# Patient Record
Sex: FEMALE | Race: AMERICAN INDIAN OR ALASKA NATIVE | HISPANIC OR LATINO | URBAN - METROPOLITAN AREA
[De-identification: names, ages, dates, MRNs, and addresses within clinical notes are randomized per-mention and may not be internally consistent; named-entity substitution may affect disease eponyms.]

---

## 2017-01-16 ENCOUNTER — EMERGENCY (EMERGENCY)
Facility: HOSPITAL | Age: 57
LOS: 1 days | Discharge: HOME | End: 2017-01-16
Attending: EMERGENCY MEDICINE | Admitting: EMERGENCY MEDICINE
Payer: MEDICARE

## 2017-01-16 VITALS
RESPIRATION RATE: 18 BRPM | TEMPERATURE: 98 F | OXYGEN SATURATION: 99 % | DIASTOLIC BLOOD PRESSURE: 79 MMHG | HEART RATE: 78 BPM | WEIGHT: 171.52 LBS | SYSTOLIC BLOOD PRESSURE: 116 MMHG

## 2017-01-16 VITALS
TEMPERATURE: 98 F | RESPIRATION RATE: 18 BRPM | DIASTOLIC BLOOD PRESSURE: 73 MMHG | OXYGEN SATURATION: 100 % | HEART RATE: 81 BPM | SYSTOLIC BLOOD PRESSURE: 119 MMHG

## 2017-01-16 DIAGNOSIS — Z96.642 PRESENCE OF LEFT ARTIFICIAL HIP JOINT: Chronic | ICD-10-CM

## 2017-01-16 DIAGNOSIS — Z88.5 ALLERGY STATUS TO NARCOTIC AGENT: ICD-10-CM

## 2017-01-16 DIAGNOSIS — Z98.89 OTHER SPECIFIED POSTPROCEDURAL STATES: Chronic | ICD-10-CM

## 2017-01-16 DIAGNOSIS — Z79.82 LONG TERM (CURRENT) USE OF ASPIRIN: ICD-10-CM

## 2017-01-16 DIAGNOSIS — G44.209 TENSION-TYPE HEADACHE, UNSPECIFIED, NOT INTRACTABLE: ICD-10-CM

## 2017-01-16 DIAGNOSIS — Z90.89 ACQUIRED ABSENCE OF OTHER ORGANS: Chronic | ICD-10-CM

## 2017-01-16 DIAGNOSIS — E78.5 HYPERLIPIDEMIA, UNSPECIFIED: ICD-10-CM

## 2017-01-16 DIAGNOSIS — E78.00 PURE HYPERCHOLESTEROLEMIA, UNSPECIFIED: ICD-10-CM

## 2017-01-16 DIAGNOSIS — R07.89 OTHER CHEST PAIN: ICD-10-CM

## 2017-01-16 DIAGNOSIS — Z79.899 OTHER LONG TERM (CURRENT) DRUG THERAPY: ICD-10-CM

## 2017-01-16 DIAGNOSIS — R20.0 ANESTHESIA OF SKIN: ICD-10-CM

## 2017-01-16 DIAGNOSIS — M20.40 OTHER HAMMER TOE(S) (ACQUIRED), UNSPECIFIED FOOT: Chronic | ICD-10-CM

## 2017-01-16 DIAGNOSIS — E04.9 NONTOXIC GOITER, UNSPECIFIED: Chronic | ICD-10-CM

## 2017-01-16 DIAGNOSIS — Z90.710 ACQUIRED ABSENCE OF BOTH CERVIX AND UTERUS: Chronic | ICD-10-CM

## 2017-01-16 DIAGNOSIS — Z88.8 ALLERGY STATUS TO OTHER DRUGS, MEDICAMENTS AND BIOLOGICAL SUBSTANCES STATUS: ICD-10-CM

## 2017-01-16 DIAGNOSIS — J45.909 UNSPECIFIED ASTHMA, UNCOMPLICATED: ICD-10-CM

## 2017-01-16 DIAGNOSIS — Z88.6 ALLERGY STATUS TO ANALGESIC AGENT: ICD-10-CM

## 2017-01-16 LAB
ALBUMIN SERPL ELPH-MCNC: 3.4 G/DL — SIGNIFICANT CHANGE UP (ref 3.4–5)
ALP SERPL-CCNC: 133 U/L — HIGH (ref 40–120)
ALT FLD-CCNC: 37 U/L — SIGNIFICANT CHANGE UP (ref 12–42)
ANION GAP SERPL CALC-SCNC: 6 MMOL/L — LOW (ref 9–16)
APTT BLD: 30.9 SEC — SIGNIFICANT CHANGE UP (ref 27.5–37.4)
AST SERPL-CCNC: 24 U/L — SIGNIFICANT CHANGE UP (ref 15–37)
BASOPHILS NFR BLD AUTO: 0.9 % — SIGNIFICANT CHANGE UP (ref 0–2)
BILIRUB SERPL-MCNC: 0.9 MG/DL — SIGNIFICANT CHANGE UP (ref 0.2–1.2)
BUN SERPL-MCNC: 11 MG/DL — SIGNIFICANT CHANGE UP (ref 7–23)
CALCIUM SERPL-MCNC: 9 MG/DL — SIGNIFICANT CHANGE UP (ref 8.5–10.5)
CHLORIDE SERPL-SCNC: 105 MMOL/L — SIGNIFICANT CHANGE UP (ref 96–108)
CK MB CFR SERPL CALC: <1 NG/ML — SIGNIFICANT CHANGE UP (ref 0.5–3.6)
CK SERPL-CCNC: 109 U/L — SIGNIFICANT CHANGE UP (ref 26–192)
CO2 SERPL-SCNC: 29 MMOL/L — SIGNIFICANT CHANGE UP (ref 22–31)
CREAT SERPL-MCNC: 0.91 MG/DL — SIGNIFICANT CHANGE UP (ref 0.5–1.3)
EOSINOPHIL NFR BLD AUTO: 3.8 % — SIGNIFICANT CHANGE UP (ref 0–6)
GLUCOSE SERPL-MCNC: 102 MG/DL — HIGH (ref 70–99)
HCT VFR BLD CALC: 38.9 % — SIGNIFICANT CHANGE UP (ref 34.5–45)
HGB BLD-MCNC: 12.9 G/DL — SIGNIFICANT CHANGE UP (ref 11.5–15.5)
INR BLD: 1.07 — SIGNIFICANT CHANGE UP (ref 0.88–1.16)
LYMPHOCYTES # BLD AUTO: 37.1 % — SIGNIFICANT CHANGE UP (ref 13–44)
MCHC RBC-ENTMCNC: 28.1 PG — SIGNIFICANT CHANGE UP (ref 27–34)
MCHC RBC-ENTMCNC: 33.2 G/DL — SIGNIFICANT CHANGE UP (ref 32–36)
MCV RBC AUTO: 84.7 FL — SIGNIFICANT CHANGE UP (ref 80–100)
MONOCYTES NFR BLD AUTO: 5.6 % — SIGNIFICANT CHANGE UP (ref 2–14)
NEUTROPHILS NFR BLD AUTO: 52.6 % — SIGNIFICANT CHANGE UP (ref 43–77)
PLATELET # BLD AUTO: 186 K/UL — SIGNIFICANT CHANGE UP (ref 150–400)
POTASSIUM SERPL-MCNC: 4.5 MMOL/L — SIGNIFICANT CHANGE UP (ref 3.5–5.3)
POTASSIUM SERPL-SCNC: 4.5 MMOL/L — SIGNIFICANT CHANGE UP (ref 3.5–5.3)
PROT SERPL-MCNC: 7.5 G/DL — SIGNIFICANT CHANGE UP (ref 6.4–8.2)
PROTHROM AB SERPL-ACNC: 11.9 SEC — SIGNIFICANT CHANGE UP (ref 10–13.1)
RBC # BLD: 4.59 M/UL — SIGNIFICANT CHANGE UP (ref 3.8–5.2)
RBC # FLD: 13.1 % — SIGNIFICANT CHANGE UP (ref 10.3–16.9)
SODIUM SERPL-SCNC: 140 MMOL/L — SIGNIFICANT CHANGE UP (ref 135–145)
TROPONIN I SERPL-MCNC: <0.015 NG/ML — SIGNIFICANT CHANGE UP (ref 0.01–0.04)
TROPONIN I SERPL-MCNC: <0.015 NG/ML — SIGNIFICANT CHANGE UP (ref 0.01–0.04)
WBC # BLD: 6.6 K/UL — SIGNIFICANT CHANGE UP (ref 3.8–10.5)
WBC # FLD AUTO: 6.6 K/UL — SIGNIFICANT CHANGE UP (ref 3.8–10.5)

## 2017-01-16 PROCEDURE — 71045 X-RAY EXAM CHEST 1 VIEW: CPT

## 2017-01-16 PROCEDURE — 71010: CPT | Mod: 26

## 2017-01-16 PROCEDURE — 85025 COMPLETE CBC W/AUTO DIFF WBC: CPT

## 2017-01-16 PROCEDURE — 70496 CT ANGIOGRAPHY HEAD: CPT

## 2017-01-16 PROCEDURE — 82550 ASSAY OF CK (CPK): CPT

## 2017-01-16 PROCEDURE — 85610 PROTHROMBIN TIME: CPT

## 2017-01-16 PROCEDURE — 93005 ELECTROCARDIOGRAM TRACING: CPT

## 2017-01-16 PROCEDURE — 82553 CREATINE MB FRACTION: CPT

## 2017-01-16 PROCEDURE — 70450 CT HEAD/BRAIN W/O DYE: CPT | Mod: 26,59

## 2017-01-16 PROCEDURE — 93010 ELECTROCARDIOGRAM REPORT: CPT

## 2017-01-16 PROCEDURE — 99285 EMERGENCY DEPT VISIT HI MDM: CPT | Mod: 25

## 2017-01-16 PROCEDURE — 70496 CT ANGIOGRAPHY HEAD: CPT | Mod: 26

## 2017-01-16 PROCEDURE — 99284 EMERGENCY DEPT VISIT MOD MDM: CPT | Mod: 25

## 2017-01-16 PROCEDURE — 70498 CT ANGIOGRAPHY NECK: CPT | Mod: 26

## 2017-01-16 PROCEDURE — 70498 CT ANGIOGRAPHY NECK: CPT

## 2017-01-16 PROCEDURE — 36415 COLL VENOUS BLD VENIPUNCTURE: CPT

## 2017-01-16 PROCEDURE — 85730 THROMBOPLASTIN TIME PARTIAL: CPT

## 2017-01-16 PROCEDURE — 70450 CT HEAD/BRAIN W/O DYE: CPT

## 2017-01-16 PROCEDURE — 84484 ASSAY OF TROPONIN QUANT: CPT

## 2017-01-16 PROCEDURE — 80053 COMPREHEN METABOLIC PANEL: CPT

## 2017-01-16 NOTE — ED ADULT TRIAGE NOTE - CHIEF COMPLAINT QUOTE
c/o left facial numbness/pain and chest pressure that started yesterday. pt was referred by neurologist to r/o TIA. Noted weakness to left arm. Pt with spinal cord stimulator for spinal cord injury

## 2017-01-16 NOTE — ED ADULT NURSE NOTE - PMH
Anxiety  /Depression  Anxiety  panic attacks  Asthma  deniies recent attack, or hospitalization  Asthma    Disc degeneration, lumbosacral    Fibromyalgia    GERD (gastroesophageal reflux disease)    GERD (gastroesophageal reflux disease)    HLD (hyperlipidemia)    Hypercholesteremia    Hypothyroid    Hypothyroidism    IBS (irritable bowel syndrome)    IBS (irritable bowel syndrome)    Insomnia    Leiomyoma    Lower back pain  Chronic  Lumbar spondylosis    OA (osteoarthritis)    Osteoarthritis    Osteoporosis    Rotator cuff injury  right  Thyroid nodule

## 2017-01-16 NOTE — ED PROVIDER NOTE - CARE PLAN
Principal Discharge DX:	Numbness of face  Secondary Diagnosis:	Acute non intractable tension-type headache  Secondary Diagnosis:	Other chest pain

## 2017-01-16 NOTE — ED PROVIDER NOTE - PROGRESS NOTE DETAILS
Pt denies pain, states that numbness improved. Understands to follow up with neurology and cardiology as an outpatient.

## 2017-01-16 NOTE — ED ADULT NURSE NOTE - PSH
H/O abdominal hysterectomy    H/O arthroscopy of left knee  2010  H/O cervical discectomy  6/2012  H/O colonoscopy with polypectomy    H/O elbow surgery  1974 - left  H/O partial thyroidectomy  2006  H/O thyroidectomy  complete 2012  Hammertoe  s/p repair 1995  History of back surgery  Cervical and Lumbar Spine, most recently 04/2015  History of carpal tunnel release  Right Carpal Tunnel Release  History of partial hysterectomy    History of tonsillectomy    History of total hip replacement, left  01/2015  S/P rotator cuff repair  2001 - right  Status post arthroscopic knee surgery  Left Knee  Status post rotator cuff repair    Thyroid goiter  s/p resection X 2

## 2017-01-16 NOTE — ED ADULT NURSE NOTE - OBJECTIVE STATEMENT
Pt ambulatory to ER for evaluation of numbness and pain to L side of face x 2 days ago w/symptoms becoming increasingly worse since yesterday. Reports "chest pressure for a couple of days." +nausea. Denies fever/chills, vomiting, abdominal pain. patient states no new numbness in lower extremities, reports tingling in upper extremities although pt reports she usually has the same symptoms at baseline due to neuropathy. Speaking in full clear sentences. EKG at triage. Attached to CM, o2 sat monitor, awaiting further medical evaluation at this time. safety precautions maintained, will continue to monitor.

## 2017-01-16 NOTE — ED PROVIDER NOTE - MEDICAL DECISION MAKING DETAILS
57 yo female with multiple medical complaints including HA, CP, numbness tingling sent into ED by Dr. Santana, pt cardiologist (no h/o MI as per pt). She is very anxious on exam and continues to change her story in terms of her symptoms. Labs, CT, CTA unremarkable. Stroke attending evaluated pt in and also feels that symptoms are not consistent with CVA. Will 2 trop given CP, and alert Dr. Santana. I also do not feel that pt is having TIA, acute CVA, SAH or ACS given inconsistency and distribution of symptoms and lack of objective findings.

## 2017-01-16 NOTE — ED PROVIDER NOTE - OBJECTIVE STATEMENT
55 yo female with psych history presenting with multiple complaints to her cardiologist. Pt was having a few days of sharp "shooting" headache bifrontal that would last for approximately 10 seconds then resolve at which time pt describes numbness of the L side of her lower face as well as numbness and tingling of finger tips on both hands. Pt also describes chest pain, intermittent in nature that is non radiating. Pt denies SOB, no cardiac or PE risk factors noted.

## 2017-04-17 ENCOUNTER — FORM ENCOUNTER (OUTPATIENT)
Age: 57
End: 2017-04-17

## 2017-04-18 ENCOUNTER — OUTPATIENT (OUTPATIENT)
Dept: OUTPATIENT SERVICES | Facility: HOSPITAL | Age: 57
LOS: 1 days | End: 2017-04-18
Payer: MEDICARE

## 2017-04-18 DIAGNOSIS — Z90.710 ACQUIRED ABSENCE OF BOTH CERVIX AND UTERUS: Chronic | ICD-10-CM

## 2017-04-18 DIAGNOSIS — Z98.89 OTHER SPECIFIED POSTPROCEDURAL STATES: Chronic | ICD-10-CM

## 2017-04-18 DIAGNOSIS — E04.9 NONTOXIC GOITER, UNSPECIFIED: Chronic | ICD-10-CM

## 2017-04-18 DIAGNOSIS — Z90.89 ACQUIRED ABSENCE OF OTHER ORGANS: Chronic | ICD-10-CM

## 2017-04-18 DIAGNOSIS — M20.40 OTHER HAMMER TOE(S) (ACQUIRED), UNSPECIFIED FOOT: Chronic | ICD-10-CM

## 2017-04-18 DIAGNOSIS — Z96.642 PRESENCE OF LEFT ARTIFICIAL HIP JOINT: Chronic | ICD-10-CM

## 2017-04-18 PROCEDURE — 76536 US EXAM OF HEAD AND NECK: CPT | Mod: 26

## 2017-04-18 PROCEDURE — 76536 US EXAM OF HEAD AND NECK: CPT

## 2017-04-28 VITALS
TEMPERATURE: 99 F | OXYGEN SATURATION: 97 % | HEART RATE: 87 BPM | RESPIRATION RATE: 18 BRPM | SYSTOLIC BLOOD PRESSURE: 134 MMHG | HEIGHT: 61 IN | DIASTOLIC BLOOD PRESSURE: 67 MMHG | WEIGHT: 169.98 LBS

## 2017-04-28 RX ORDER — ALBUTEROL 90 UG/1
0 AEROSOL, METERED ORAL
Qty: 0 | Refills: 0 | COMMUNITY

## 2017-04-28 RX ORDER — MONTELUKAST 4 MG/1
1 TABLET, CHEWABLE ORAL
Qty: 0 | Refills: 0 | COMMUNITY

## 2017-04-28 RX ORDER — CYCLOBENZAPRINE HYDROCHLORIDE 10 MG/1
1 TABLET, FILM COATED ORAL
Qty: 0 | Refills: 0 | COMMUNITY

## 2017-04-28 NOTE — ASU PATIENT PROFILE, ADULT - PSH
H/O abdominal hysterectomy    H/O arthroscopy of left knee  2010  H/O cervical discectomy  6/2012  H/O colonoscopy with polypectomy    H/O elbow surgery  1974 - left  H/O partial thyroidectomy  2006  H/O thyroidectomy  complete 2012  Hammertoe  s/p repair 1995  History of back surgery  Cervical and Lumbar Spine, most recently 04/2015  History of carpal tunnel release  Right Carpal Tunnel Release  History of partial hysterectomy    History of tonsillectomy    History of total hip replacement, left  01/2015  S/P rotator cuff repair  2001 - right  Status post arthroscopic knee surgery  Left Knee  Status post rotator cuff repair    Thyroid goiter  s/p resection X 2 H/O abdominal hysterectomy    H/O arthroscopy of left knee  2010  H/O cervical discectomy  6/2012  H/O colonoscopy with polypectomy    H/O elbow surgery  1974 - left  H/O partial thyroidectomy  2006  H/O thyroidectomy  complete 2012  Hammertoe  s/p repair 1995  History of back surgery  Cervical and Lumbar Spine, most recently 04/2015  History of carpal tunnel release  Right Carpal Tunnel Release  History of partial hysterectomy    History of tonsillectomy    History of total hip replacement, left  01/2015  S/P rotator cuff repair  2001 - right  Spinal cord stimulator status    Status post arthroscopic knee surgery  Left Knee  Status post rotator cuff repair    Thyroid goiter  s/p resection X 2

## 2017-04-28 NOTE — ASU PATIENT PROFILE, ADULT - TEACHING/LEARNING LEARNING PREFERENCES
pictorial/written material/verbal instruction/audio/skill demonstration written material/skill demonstration/audio

## 2017-04-28 NOTE — ASU PREOP CHECKLIST - INTERNAL PROSTHESES
yes(specify)/Left THR, metal in left lumbar, right cervical spinal cord stimulator ;Left THR, metal in left lumbar, right cervical/yes(specify)

## 2017-05-01 ENCOUNTER — RESULT REVIEW (OUTPATIENT)
Age: 57
End: 2017-05-01

## 2017-05-01 ENCOUNTER — OUTPATIENT (OUTPATIENT)
Dept: OUTPATIENT SERVICES | Facility: HOSPITAL | Age: 57
LOS: 1 days | Discharge: HOME | End: 2017-05-01
Payer: MEDICARE

## 2017-05-01 ENCOUNTER — APPOINTMENT (OUTPATIENT)
Dept: SPINE | Facility: HOSPITAL | Age: 57
End: 2017-05-01

## 2017-05-01 VITALS
DIASTOLIC BLOOD PRESSURE: 67 MMHG | OXYGEN SATURATION: 99 % | RESPIRATION RATE: 16 BRPM | HEART RATE: 74 BPM | SYSTOLIC BLOOD PRESSURE: 100 MMHG | TEMPERATURE: 98 F

## 2017-05-01 DIAGNOSIS — Z98.89 OTHER SPECIFIED POSTPROCEDURAL STATES: Chronic | ICD-10-CM

## 2017-05-01 DIAGNOSIS — E04.9 NONTOXIC GOITER, UNSPECIFIED: Chronic | ICD-10-CM

## 2017-05-01 DIAGNOSIS — M20.40 OTHER HAMMER TOE(S) (ACQUIRED), UNSPECIFIED FOOT: Chronic | ICD-10-CM

## 2017-05-01 DIAGNOSIS — Z90.89 ACQUIRED ABSENCE OF OTHER ORGANS: Chronic | ICD-10-CM

## 2017-05-01 DIAGNOSIS — Z96.642 PRESENCE OF LEFT ARTIFICIAL HIP JOINT: Chronic | ICD-10-CM

## 2017-05-01 DIAGNOSIS — Z90.710 ACQUIRED ABSENCE OF BOTH CERVIX AND UTERUS: Chronic | ICD-10-CM

## 2017-05-01 DIAGNOSIS — Z96.89 PRESENCE OF OTHER SPECIFIED FUNCTIONAL IMPLANTS: Chronic | ICD-10-CM

## 2017-05-01 PROCEDURE — C1778: CPT

## 2017-05-01 PROCEDURE — 86850 RBC ANTIBODY SCREEN: CPT

## 2017-05-01 PROCEDURE — C1889: CPT

## 2017-05-01 PROCEDURE — 63685 INS/RPLC SPI NPG/RCVR POCKET: CPT

## 2017-05-01 PROCEDURE — 76000 FLUOROSCOPY <1 HR PHYS/QHP: CPT

## 2017-05-01 PROCEDURE — C1713: CPT

## 2017-05-01 PROCEDURE — C1822: CPT

## 2017-05-01 PROCEDURE — 86901 BLOOD TYPING SEROLOGIC RH(D): CPT

## 2017-05-01 PROCEDURE — 63655 IMPLANT NEUROELECTRODES: CPT

## 2017-05-01 PROCEDURE — 88300 SURGICAL PATH GROSS: CPT

## 2017-05-01 PROCEDURE — 86900 BLOOD TYPING SEROLOGIC ABO: CPT

## 2017-05-01 RX ORDER — HYDROMORPHONE HYDROCHLORIDE 2 MG/ML
0.5 INJECTION INTRAMUSCULAR; INTRAVENOUS; SUBCUTANEOUS
Qty: 0 | Refills: 0 | Status: DISCONTINUED | OUTPATIENT
Start: 2017-05-01 | End: 2017-05-01

## 2017-05-01 RX ORDER — CEPHALEXIN 500 MG
1 CAPSULE ORAL
Qty: 20 | Refills: 0 | OUTPATIENT
Start: 2017-05-01 | End: 2017-05-06

## 2017-05-01 RX ORDER — SODIUM CHLORIDE 9 MG/ML
1000 INJECTION INTRAMUSCULAR; INTRAVENOUS; SUBCUTANEOUS
Qty: 0 | Refills: 0 | Status: DISCONTINUED | OUTPATIENT
Start: 2017-05-01 | End: 2017-05-01

## 2017-05-01 RX ORDER — BUPIVACAINE 13.3 MG/ML
20 INJECTION, SUSPENSION, LIPOSOMAL INFILTRATION ONCE
Qty: 0 | Refills: 0 | Status: DISCONTINUED | OUTPATIENT
Start: 2017-05-01 | End: 2017-05-01

## 2017-05-01 RX ADMIN — HYDROMORPHONE HYDROCHLORIDE 0.5 MILLIGRAM(S): 2 INJECTION INTRAMUSCULAR; INTRAVENOUS; SUBCUTANEOUS at 13:15

## 2017-05-01 RX ADMIN — HYDROMORPHONE HYDROCHLORIDE 0.5 MILLIGRAM(S): 2 INJECTION INTRAMUSCULAR; INTRAVENOUS; SUBCUTANEOUS at 13:01

## 2017-05-05 DIAGNOSIS — E78.5 HYPERLIPIDEMIA, UNSPECIFIED: ICD-10-CM

## 2017-05-05 DIAGNOSIS — M51.36 OTHER INTERVERTEBRAL DISC DEGENERATION, LUMBAR REGION: ICD-10-CM

## 2017-05-05 DIAGNOSIS — Z87.891 PERSONAL HISTORY OF NICOTINE DEPENDENCE: ICD-10-CM

## 2017-05-05 DIAGNOSIS — Z88.5 ALLERGY STATUS TO NARCOTIC AGENT: ICD-10-CM

## 2017-05-05 DIAGNOSIS — T85.122A DISPLACEMENT OF IMPLANTED ELECTRONIC NEUROSTIMULATOR OF SPINAL CORD ELECTRODE (LEAD), INITIAL ENCOUNTER: ICD-10-CM
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
## 2017-05-05 DIAGNOSIS — F32.9 MAJOR DEPRESSIVE DISORDER, SINGLE EPISODE, UNSPECIFIED: ICD-10-CM

## 2017-05-05 DIAGNOSIS — E03.9 HYPOTHYROIDISM, UNSPECIFIED: ICD-10-CM

## 2017-05-05 DIAGNOSIS — J45.909 UNSPECIFIED ASTHMA, UNCOMPLICATED: ICD-10-CM

## 2017-05-07 LAB — SURGICAL PATHOLOGY STUDY: SIGNIFICANT CHANGE UP

## 2017-05-10 ENCOUNTER — EMERGENCY (EMERGENCY)
Facility: HOSPITAL | Age: 57
LOS: 1 days | Discharge: HOME | End: 2017-05-10
Attending: EMERGENCY MEDICINE | Admitting: EMERGENCY MEDICINE
Payer: MEDICARE

## 2017-05-10 VITALS
DIASTOLIC BLOOD PRESSURE: 72 MMHG | SYSTOLIC BLOOD PRESSURE: 106 MMHG | TEMPERATURE: 98 F | HEART RATE: 83 BPM | OXYGEN SATURATION: 99 % | RESPIRATION RATE: 20 BRPM | WEIGHT: 175.93 LBS

## 2017-05-10 VITALS
HEART RATE: 84 BPM | RESPIRATION RATE: 20 BRPM | SYSTOLIC BLOOD PRESSURE: 133 MMHG | DIASTOLIC BLOOD PRESSURE: 67 MMHG | OXYGEN SATURATION: 98 % | TEMPERATURE: 98 F

## 2017-05-10 DIAGNOSIS — Z96.89 PRESENCE OF OTHER SPECIFIED FUNCTIONAL IMPLANTS: Chronic | ICD-10-CM

## 2017-05-10 DIAGNOSIS — Z98.89 OTHER SPECIFIED POSTPROCEDURAL STATES: Chronic | ICD-10-CM

## 2017-05-10 DIAGNOSIS — E78.5 HYPERLIPIDEMIA, UNSPECIFIED: ICD-10-CM

## 2017-05-10 DIAGNOSIS — M20.40 OTHER HAMMER TOE(S) (ACQUIRED), UNSPECIFIED FOOT: Chronic | ICD-10-CM

## 2017-05-10 DIAGNOSIS — Z96.642 PRESENCE OF LEFT ARTIFICIAL HIP JOINT: Chronic | ICD-10-CM

## 2017-05-10 DIAGNOSIS — Z79.899 OTHER LONG TERM (CURRENT) DRUG THERAPY: ICD-10-CM

## 2017-05-10 DIAGNOSIS — Z88.6 ALLERGY STATUS TO ANALGESIC AGENT: ICD-10-CM

## 2017-05-10 DIAGNOSIS — R06.02 SHORTNESS OF BREATH: ICD-10-CM

## 2017-05-10 DIAGNOSIS — Z90.710 ACQUIRED ABSENCE OF BOTH CERVIX AND UTERUS: Chronic | ICD-10-CM

## 2017-05-10 DIAGNOSIS — E04.9 NONTOXIC GOITER, UNSPECIFIED: Chronic | ICD-10-CM

## 2017-05-10 DIAGNOSIS — Z88.5 ALLERGY STATUS TO NARCOTIC AGENT: ICD-10-CM

## 2017-05-10 DIAGNOSIS — M54.6 PAIN IN THORACIC SPINE: ICD-10-CM

## 2017-05-10 DIAGNOSIS — Z90.89 ACQUIRED ABSENCE OF OTHER ORGANS: Chronic | ICD-10-CM

## 2017-05-10 DIAGNOSIS — J45.909 UNSPECIFIED ASTHMA, UNCOMPLICATED: ICD-10-CM

## 2017-05-10 DIAGNOSIS — Z79.2 LONG TERM (CURRENT) USE OF ANTIBIOTICS: ICD-10-CM

## 2017-05-10 DIAGNOSIS — E78.00 PURE HYPERCHOLESTEROLEMIA, UNSPECIFIED: ICD-10-CM

## 2017-05-10 DIAGNOSIS — E03.9 HYPOTHYROIDISM, UNSPECIFIED: ICD-10-CM

## 2017-05-10 LAB
ALBUMIN SERPL ELPH-MCNC: 3 G/DL — LOW (ref 3.4–5)
ALP SERPL-CCNC: 102 U/L — SIGNIFICANT CHANGE UP (ref 40–120)
ALT FLD-CCNC: 32 U/L — SIGNIFICANT CHANGE UP (ref 12–42)
ANION GAP SERPL CALC-SCNC: 7 MMOL/L — LOW (ref 9–16)
APTT BLD: 31.4 SEC — SIGNIFICANT CHANGE UP (ref 27.5–37.4)
AST SERPL-CCNC: 14 U/L — LOW (ref 15–37)
BILIRUB SERPL-MCNC: 0.4 MG/DL — SIGNIFICANT CHANGE UP (ref 0.2–1.2)
BUN SERPL-MCNC: 16 MG/DL — SIGNIFICANT CHANGE UP (ref 7–23)
CALCIUM SERPL-MCNC: 8.6 MG/DL — SIGNIFICANT CHANGE UP (ref 8.5–10.5)
CHLORIDE SERPL-SCNC: 108 MMOL/L — SIGNIFICANT CHANGE UP (ref 96–108)
CO2 SERPL-SCNC: 25 MMOL/L — SIGNIFICANT CHANGE UP (ref 22–31)
CREAT SERPL-MCNC: 0.8 MG/DL — SIGNIFICANT CHANGE UP (ref 0.5–1.3)
GLUCOSE SERPL-MCNC: 96 MG/DL — SIGNIFICANT CHANGE UP (ref 70–99)
HCT VFR BLD CALC: 32.9 % — LOW (ref 34.5–45)
HGB BLD-MCNC: 10.8 G/DL — LOW (ref 11.5–15.5)
INR BLD: 1.1 — SIGNIFICANT CHANGE UP (ref 0.88–1.16)
MCHC RBC-ENTMCNC: 27.7 PG — SIGNIFICANT CHANGE UP (ref 27–34)
MCHC RBC-ENTMCNC: 32.8 G/DL — SIGNIFICANT CHANGE UP (ref 32–36)
MCV RBC AUTO: 84.4 FL — SIGNIFICANT CHANGE UP (ref 80–100)
PLATELET # BLD AUTO: 292 K/UL — SIGNIFICANT CHANGE UP (ref 150–400)
POTASSIUM SERPL-MCNC: 4.1 MMOL/L — SIGNIFICANT CHANGE UP (ref 3.5–5.3)
POTASSIUM SERPL-SCNC: 4.1 MMOL/L — SIGNIFICANT CHANGE UP (ref 3.5–5.3)
PROT SERPL-MCNC: 7 G/DL — SIGNIFICANT CHANGE UP (ref 6.4–8.2)
PROTHROM AB SERPL-ACNC: 12.2 SEC — SIGNIFICANT CHANGE UP (ref 9.8–12.7)
RBC # BLD: 3.9 M/UL — SIGNIFICANT CHANGE UP (ref 3.8–5.2)
RBC # FLD: 13.3 % — SIGNIFICANT CHANGE UP (ref 10.3–16.9)
SODIUM SERPL-SCNC: 140 MMOL/L — SIGNIFICANT CHANGE UP (ref 135–145)
WBC # BLD: 9.6 K/UL — SIGNIFICANT CHANGE UP (ref 3.8–10.5)
WBC # FLD AUTO: 9.6 K/UL — SIGNIFICANT CHANGE UP (ref 3.8–10.5)

## 2017-05-10 PROCEDURE — 71275 CT ANGIOGRAPHY CHEST: CPT

## 2017-05-10 PROCEDURE — 80053 COMPREHEN METABOLIC PANEL: CPT

## 2017-05-10 PROCEDURE — 99284 EMERGENCY DEPT VISIT MOD MDM: CPT | Mod: 25

## 2017-05-10 PROCEDURE — 93010 ELECTROCARDIOGRAM REPORT: CPT

## 2017-05-10 PROCEDURE — 85027 COMPLETE CBC AUTOMATED: CPT

## 2017-05-10 PROCEDURE — 71275 CT ANGIOGRAPHY CHEST: CPT | Mod: 26

## 2017-05-10 PROCEDURE — 71045 X-RAY EXAM CHEST 1 VIEW: CPT

## 2017-05-10 PROCEDURE — 71010: CPT | Mod: 26

## 2017-05-10 PROCEDURE — 72070 X-RAY EXAM THORAC SPINE 2VWS: CPT

## 2017-05-10 PROCEDURE — 85730 THROMBOPLASTIN TIME PARTIAL: CPT

## 2017-05-10 PROCEDURE — 36415 COLL VENOUS BLD VENIPUNCTURE: CPT

## 2017-05-10 PROCEDURE — 85610 PROTHROMBIN TIME: CPT

## 2017-05-10 PROCEDURE — 72070 X-RAY EXAM THORAC SPINE 2VWS: CPT | Mod: 26

## 2017-05-10 PROCEDURE — 72100 X-RAY EXAM L-S SPINE 2/3 VWS: CPT

## 2017-05-10 PROCEDURE — 72100 X-RAY EXAM L-S SPINE 2/3 VWS: CPT | Mod: 26

## 2017-05-10 PROCEDURE — 93005 ELECTROCARDIOGRAM TRACING: CPT

## 2017-05-10 NOTE — ED ADULT TRIAGE NOTE - CHIEF COMPLAINT QUOTE
"chest pain; upper back pain and short of breath 10 days; S/P Spinal Surgery 10 days ago; Refer by Dr Jay

## 2017-05-10 NOTE — ED PROVIDER NOTE - OBJECTIVE STATEMENT
57y female extensive PMH including revision of spinal cord stimulator (chronic back pain from 'nerve damage') on 5/1/17 by Dr Wood reports b/l mid back pain / "lung pain" with shortness of breath since her surgery. No fever, cough, chest pain. No LE swelling or calf pain/swelling. No abd pain, bowel/bladder incontinence/retention, LE weakness or sensory change. No h/o PE.

## 2017-05-10 NOTE — ED PROVIDER NOTE - PROGRESS NOTE DETAILS
CTA negative for PE. D/w neurosurgery again who reviewed XRs and confirms that stimulator and its components are properly placed. D/w pt's cardiologist Dr Santana - had cardiac angiogram 7/2016 which was normal (faxed to ED by PA and placed in chart). CTA negative for PE. D/w neurosurgery again who reviewed XRs and confirms that stimulator and its components are properly placed. D/w pt's cardiologist Dr Santana - had cardiac angiogram 7/2016 which was normal -0% stenosis with normal EF (faxed to ED by PA and placed in chart). D/w pt all results. Recommend f/u with PMD and Dr Santana this week. Return precautions given to which pt voiced understanding.

## 2017-05-10 NOTE — ED ADULT NURSE NOTE - OBJECTIVE STATEMENT
Pt sent in by MD for SOB, bilat upper back pain, CP, epigastric pain "like a balloon is bloated inside me." Pt sent in by MD for SOB, bilat upper back pain, CP, epigastric pain "like a balloon is bloated inside me," with some nausea and chills. Pt has 39 staples along her spine from a spinal cord stimulator revision surgery performed 10 days ago, no S/S infx. Pt denies fever, LE weakness, cough, bowel/bladder incontinence. Pt has normal sensation UE/LE/face. Last BM yesterday described as loose. Pt placed on cardiac monitor, will maintain safety.

## 2017-05-10 NOTE — ED PROVIDER NOTE - MUSCULOSKELETAL, MLM
2 midline vertical surgical incisions at T and L spine, 1 horizontal incision at right buttock - all 3 c/d/i without cellulitis or DC. no bony spine ttp/stepoff.

## 2017-05-10 NOTE — CONSULT NOTE ADULT - SUBJECTIVE AND OBJECTIVE BOX
HISTORY OF PRESENT ILLNESS:   56 y/o female wiht extensive pmhx well known to NSX serice s/p elective spinal cord stimulator placement 10 days ago by Dr. Brewer. Patient reports today for symptoms c/w chest pain, shortness of breath since surgery. Patient describes the pain as constant located under her right breast described as heaviness. She was taking dilaudid po and valium for these symptoms which helped to temporarily relieve her pain. She reported constipation initially after surgery but has reported diarrhea since yesterday. She denies any bladder issues. She denies any fever or chills. Denies any sputum production or other pain.     PAST MEDICAL & SURGICAL HISTORY:  HLD (hyperlipidemia)  Anxiety: /Depression  Asthma  OA (osteoarthritis)  Hypothyroid  GERD (gastroesophageal reflux disease)  IBS (irritable bowel syndrome)  Lower back pain: Chronic  Disc degeneration, lumbosacral  GERD (gastroesophageal reflux disease)  Anxiety: panic attacks  Insomnia  Osteoporosis  Lumbar spondylosis  Rotator cuff injury: right  Leiomyoma  Osteoarthritis  Thyroid nodule  IBS (irritable bowel syndrome)  Fibromyalgia  Hypercholesteremia  Asthma: deniies recent attack, or hospitalization  Hypothyroidism  Spinal cord stimulator status  History of carpal tunnel release: Right Carpal Tunnel Release  History of tonsillectomy  Thyroid goiter: s/p resection X 2  History of back surgery: Cervical and Lumbar Spine, most recently 04/2015  History of partial hysterectomy  Status post rotator cuff repair  Status post arthroscopic knee surgery: Left Knee  History of total hip replacement, left: 01/2015  H/O abdominal hysterectomy  H/O elbow surgery: 1974 - left  Hammertoe: s/p repair 1995  S/P rotator cuff repair: 2001 - right  H/O colonoscopy with polypectomy  H/O arthroscopy of left knee: 2010  H/O cervical discectomy: 6/2012  H/O thyroidectomy: complete 2012  H/O partial thyroidectomy: 2006    FAMILY HISTORY:  FH: heart disease (Father)  Diabetes mellitus (Father)  Family history of hypertension (Father)  Family history of hypertension (Mother)      SOCIAL HISTORY:  Tobacco Use: quit 13 yrs ago  EtOH use: denies  Substance: denies    Allergies    codeine (Headache; Pruritus)  codeine (Pruritus; Other)  hydrocodone (Swelling; Anaphylaxis; Nausea; Hives)  Percocet 5/325 (Anaphylaxis)  Vicodin (Other; Pruritus)  Vicodin (Pruritus; Nausea; Headache)    Intolerances        REVIEW OF SYSTEMS  General: denies fever chills or malaise	  Ophthalmologic: denies blurry vision   Respiratory and Thorax: see above hpi  Cardiovascular:	denies palpitations  Gastrointestinal:	 see above hpi  Genitourinary: denies any pain or discomfort with urination	  Musculoskeletal: reports chronic neck and back pain, denies any new pain	  Neurological: denies any weakness, numbness or tingling	    MEDICATIONS:  Antibiotics:    Neuro:    Anticoagulation:    OTHER:    IVF:      Vital Signs Last 24 Hrs  T(C): 36.7, Max: 36.7 (05-10 @ 15:43)  T(F): 98.1, Max: 98.1 (05-10 @ 15:43)  HR: 85 (83 - 86)  BP: 103/64 (103/64 - 124/83)  BP(mean): --  RR: 20 (20 - 20)  SpO2: 98% (98% - 100%)    PHYSICAL EXAM:  Constitutional: appears comfortable, no distress  Eyes: EOMI, PERRL  Neck: rOM intact  Back: staples intact  Neurological: A&O x 3, face symmetric, neg drift, RICE x 4, no focal deficits, motor 5/5 throughout, sensation intact b/l    LABS:                        10.8   9.6   )-----------( 292      ( 10 May 2017 16:26 )             32.9     05-10    140  |  108  |  16  ----------------------------<  96  4.1   |  25  |  0.80    Ca    8.6      10 May 2017 16:25    TPro  7.0  /  Alb  3.0<L>  /  TBili  0.4  /  DBili  x   /  AST  14<L>  /  ALT  32  /  AlkPhos  102  05-10    PT/INR - ( 10 May 2017 16:25 )   PT: 12.2 sec;   INR: 1.10          PTT - ( 10 May 2017 16:25 )  PTT:31.4 sec    CULTURES:      RADIOLOGY & ADDITIONAL STUDIES:  No pulmonary embolism is seen.  Status post placement of a spinal cord stimulation implant. Mild   postoperative changes.  0.2 cm nodule in the left upper lobe. Based on the 2017 Fleischner   Society criteria, if the patient has a history of smoking or is otherwise   at high risk for lung cancer, follow-up chest CT in 12 months may be   considered to ensure stability. If the patient is at low risk for lung  cancer, follow-up is not necessary.    CXR: IMPRESSION: No evidence of active disease.

## 2017-05-10 NOTE — ED PROVIDER NOTE - NEUROLOGICAL, MLM
Alert and oriented, no focal deficits, no motor or sensory deficits. full strength b/l UE and b/l LE 5/5. normal LE sensation/no saddle anesthesia.

## 2017-05-10 NOTE — ED PROVIDER NOTE - MEDICAL DECISION MAKING DETAILS
57y female with mid back pain "lung pain" after revision of spinal cord stimulator. Well appearing. Ambulatory surgery so low risk for PE but pt has not been as mobile at home. Labs, CTA r/o PE pending. D/w neurosurgery team - recommend XR T&L-spine to assess stimulator placement and will evaluate in ED.

## 2017-05-15 ENCOUNTER — APPOINTMENT (OUTPATIENT)
Dept: SPINE | Facility: CLINIC | Age: 57
End: 2017-05-15

## 2017-05-15 VITALS
HEART RATE: 84 BPM | DIASTOLIC BLOOD PRESSURE: 80 MMHG | BODY MASS INDEX: 30.96 KG/M2 | SYSTOLIC BLOOD PRESSURE: 119 MMHG | WEIGHT: 164 LBS | TEMPERATURE: 98 F | OXYGEN SATURATION: 97 % | HEIGHT: 61 IN

## 2017-07-11 ENCOUNTER — FORM ENCOUNTER (OUTPATIENT)
Age: 57
End: 2017-07-11

## 2017-07-12 ENCOUNTER — OUTPATIENT (OUTPATIENT)
Dept: OUTPATIENT SERVICES | Facility: HOSPITAL | Age: 57
LOS: 1 days | End: 2017-07-12
Payer: MEDICARE

## 2017-07-12 ENCOUNTER — APPOINTMENT (OUTPATIENT)
Dept: SPINE | Facility: CLINIC | Age: 57
End: 2017-07-12

## 2017-07-12 VITALS
DIASTOLIC BLOOD PRESSURE: 77 MMHG | OXYGEN SATURATION: 98 % | HEART RATE: 94 BPM | BODY MASS INDEX: 31.34 KG/M2 | WEIGHT: 166 LBS | HEIGHT: 61 IN | SYSTOLIC BLOOD PRESSURE: 117 MMHG

## 2017-07-12 DIAGNOSIS — Z98.89 OTHER SPECIFIED POSTPROCEDURAL STATES: Chronic | ICD-10-CM

## 2017-07-12 DIAGNOSIS — E04.9 NONTOXIC GOITER, UNSPECIFIED: Chronic | ICD-10-CM

## 2017-07-12 DIAGNOSIS — Z90.710 ACQUIRED ABSENCE OF BOTH CERVIX AND UTERUS: Chronic | ICD-10-CM

## 2017-07-12 DIAGNOSIS — Z90.89 ACQUIRED ABSENCE OF OTHER ORGANS: Chronic | ICD-10-CM

## 2017-07-12 DIAGNOSIS — Z96.89 PRESENCE OF OTHER SPECIFIED FUNCTIONAL IMPLANTS: Chronic | ICD-10-CM

## 2017-07-12 DIAGNOSIS — M20.40 OTHER HAMMER TOE(S) (ACQUIRED), UNSPECIFIED FOOT: Chronic | ICD-10-CM

## 2017-07-12 DIAGNOSIS — Z96.642 PRESENCE OF LEFT ARTIFICIAL HIP JOINT: Chronic | ICD-10-CM

## 2017-07-12 PROCEDURE — 72070 X-RAY EXAM THORAC SPINE 2VWS: CPT

## 2017-07-12 PROCEDURE — 72070 X-RAY EXAM THORAC SPINE 2VWS: CPT | Mod: 26

## 2018-05-08 ENCOUNTER — OUTPATIENT (OUTPATIENT)
Dept: OUTPATIENT SERVICES | Facility: HOSPITAL | Age: 58
LOS: 1 days | End: 2018-05-08
Payer: MEDICARE

## 2018-05-08 ENCOUNTER — APPOINTMENT (OUTPATIENT)
Dept: CT IMAGING | Facility: HOSPITAL | Age: 58
End: 2018-05-08
Payer: MEDICARE

## 2018-05-08 DIAGNOSIS — Z98.89 OTHER SPECIFIED POSTPROCEDURAL STATES: Chronic | ICD-10-CM

## 2018-05-08 DIAGNOSIS — Z90.89 ACQUIRED ABSENCE OF OTHER ORGANS: Chronic | ICD-10-CM

## 2018-05-08 DIAGNOSIS — Z96.642 PRESENCE OF LEFT ARTIFICIAL HIP JOINT: Chronic | ICD-10-CM

## 2018-05-08 DIAGNOSIS — Z96.89 PRESENCE OF OTHER SPECIFIED FUNCTIONAL IMPLANTS: Chronic | ICD-10-CM

## 2018-05-08 DIAGNOSIS — Z90.710 ACQUIRED ABSENCE OF BOTH CERVIX AND UTERUS: Chronic | ICD-10-CM

## 2018-05-08 DIAGNOSIS — M20.40 OTHER HAMMER TOE(S) (ACQUIRED), UNSPECIFIED FOOT: Chronic | ICD-10-CM

## 2018-05-08 DIAGNOSIS — E04.9 NONTOXIC GOITER, UNSPECIFIED: Chronic | ICD-10-CM

## 2018-05-08 PROCEDURE — 72131 CT LUMBAR SPINE W/O DYE: CPT | Mod: 26

## 2018-05-08 PROCEDURE — 72131 CT LUMBAR SPINE W/O DYE: CPT

## 2018-06-07 ENCOUNTER — OUTPATIENT (OUTPATIENT)
Dept: OUTPATIENT SERVICES | Facility: HOSPITAL | Age: 58
LOS: 1 days | End: 2018-06-07
Payer: MEDICARE

## 2018-06-07 DIAGNOSIS — Z98.89 OTHER SPECIFIED POSTPROCEDURAL STATES: Chronic | ICD-10-CM

## 2018-06-07 DIAGNOSIS — Z90.89 ACQUIRED ABSENCE OF OTHER ORGANS: Chronic | ICD-10-CM

## 2018-06-07 DIAGNOSIS — E04.9 NONTOXIC GOITER, UNSPECIFIED: Chronic | ICD-10-CM

## 2018-06-07 DIAGNOSIS — Z90.710 ACQUIRED ABSENCE OF BOTH CERVIX AND UTERUS: Chronic | ICD-10-CM

## 2018-06-07 DIAGNOSIS — Z96.642 PRESENCE OF LEFT ARTIFICIAL HIP JOINT: Chronic | ICD-10-CM

## 2018-06-07 DIAGNOSIS — M20.40 OTHER HAMMER TOE(S) (ACQUIRED), UNSPECIFIED FOOT: Chronic | ICD-10-CM

## 2018-06-07 DIAGNOSIS — Z96.89 PRESENCE OF OTHER SPECIFIED FUNCTIONAL IMPLANTS: Chronic | ICD-10-CM

## 2018-06-07 PROCEDURE — 73522 X-RAY EXAM HIPS BI 3-4 VIEWS: CPT

## 2018-06-07 PROCEDURE — 73522 X-RAY EXAM HIPS BI 3-4 VIEWS: CPT | Mod: 26

## 2018-06-18 ENCOUNTER — APPOINTMENT (OUTPATIENT)
Dept: OTOLARYNGOLOGY | Facility: CLINIC | Age: 58
End: 2018-06-18
Payer: MEDICARE

## 2018-06-18 VITALS
HEART RATE: 95 BPM | OXYGEN SATURATION: 95 % | TEMPERATURE: 98.5 F | DIASTOLIC BLOOD PRESSURE: 83 MMHG | SYSTOLIC BLOOD PRESSURE: 131 MMHG

## 2018-06-18 DIAGNOSIS — E03.9 HYPOTHYROIDISM, UNSPECIFIED: ICD-10-CM

## 2018-06-18 DIAGNOSIS — R07.0 PAIN IN THROAT: ICD-10-CM

## 2018-06-18 DIAGNOSIS — R68.89 OTHER GENERAL SYMPTOMS AND SIGNS: ICD-10-CM

## 2018-06-18 DIAGNOSIS — M54.2 CERVICALGIA: ICD-10-CM

## 2018-06-18 DIAGNOSIS — R22.1 LOCALIZED SWELLING, MASS AND LUMP, NECK: ICD-10-CM

## 2018-06-18 DIAGNOSIS — K21.9 GASTRO-ESOPHAGEAL REFLUX DISEASE W/OUT ESOPHAGITIS: ICD-10-CM

## 2018-06-18 PROCEDURE — 31575 DIAGNOSTIC LARYNGOSCOPY: CPT

## 2018-06-18 PROCEDURE — 99204 OFFICE O/P NEW MOD 45 MIN: CPT | Mod: 25

## 2018-06-18 RX ORDER — TRAZODONE HYDROCHLORIDE 300 MG/1
TABLET ORAL
Refills: 0 | Status: ACTIVE | COMMUNITY

## 2018-06-18 RX ORDER — CLONAZEPAM 0.5 MG/1
0.5 TABLET ORAL
Refills: 0 | Status: ACTIVE | COMMUNITY

## 2018-07-03 ENCOUNTER — APPOINTMENT (OUTPATIENT)
Dept: ORTHOPEDIC SURGERY | Facility: CLINIC | Age: 58
End: 2018-07-03
Payer: MEDICARE

## 2018-07-03 VITALS — HEIGHT: 61 IN | BODY MASS INDEX: 31.34 KG/M2 | WEIGHT: 166 LBS

## 2018-07-03 DIAGNOSIS — M16.11 UNILATERAL PRIMARY OSTEOARTHRITIS, RIGHT HIP: ICD-10-CM

## 2018-07-03 DIAGNOSIS — M76.12 PSOAS TENDINITIS, LEFT HIP: ICD-10-CM

## 2018-07-03 DIAGNOSIS — T84.84XA PAIN DUE TO INTERNAL ORTHOPEDIC PROSTHETIC DEVICES, IMPLANTS AND GRAFTS, INITIAL ENCOUNTER: ICD-10-CM

## 2018-07-03 DIAGNOSIS — Z96.649 PAIN DUE TO INTERNAL ORTHOPEDIC PROSTHETIC DEVICES, IMPLANTS AND GRAFTS, INITIAL ENCOUNTER: ICD-10-CM

## 2018-07-03 PROCEDURE — 99214 OFFICE O/P EST MOD 30 MIN: CPT

## 2018-07-03 RX ORDER — MIRTAZAPINE 7.5 MG/1
TABLET, FILM COATED ORAL
Refills: 0 | Status: DISCONTINUED | COMMUNITY
End: 2018-07-03

## 2018-07-12 ENCOUNTER — FORM ENCOUNTER (OUTPATIENT)
Age: 58
End: 2018-07-12

## 2018-07-13 ENCOUNTER — OUTPATIENT (OUTPATIENT)
Dept: OUTPATIENT SERVICES | Facility: HOSPITAL | Age: 58
LOS: 1 days | End: 2018-07-13
Payer: MEDICARE

## 2018-07-13 ENCOUNTER — APPOINTMENT (OUTPATIENT)
Dept: INTERVENTIONAL RADIOLOGY/VASCULAR | Facility: HOSPITAL | Age: 58
End: 2018-07-13
Payer: MEDICARE

## 2018-07-13 DIAGNOSIS — Z98.89 OTHER SPECIFIED POSTPROCEDURAL STATES: Chronic | ICD-10-CM

## 2018-07-13 DIAGNOSIS — Z90.89 ACQUIRED ABSENCE OF OTHER ORGANS: Chronic | ICD-10-CM

## 2018-07-13 DIAGNOSIS — E04.9 NONTOXIC GOITER, UNSPECIFIED: Chronic | ICD-10-CM

## 2018-07-13 DIAGNOSIS — Z90.710 ACQUIRED ABSENCE OF BOTH CERVIX AND UTERUS: Chronic | ICD-10-CM

## 2018-07-13 DIAGNOSIS — Z96.642 PRESENCE OF LEFT ARTIFICIAL HIP JOINT: Chronic | ICD-10-CM

## 2018-07-13 DIAGNOSIS — M20.40 OTHER HAMMER TOE(S) (ACQUIRED), UNSPECIFIED FOOT: Chronic | ICD-10-CM

## 2018-07-13 DIAGNOSIS — Z96.89 PRESENCE OF OTHER SPECIFIED FUNCTIONAL IMPLANTS: Chronic | ICD-10-CM

## 2018-07-13 PROCEDURE — 20611 DRAIN/INJ JOINT/BURSA W/US: CPT | Mod: LT

## 2018-07-13 PROCEDURE — 20611 DRAIN/INJ JOINT/BURSA W/US: CPT

## 2018-07-23 PROBLEM — M16.11 PRIMARY LOCALIZED OSTEOARTHROSIS OF PELVIC REGION, RIGHT: Status: ACTIVE | Noted: 2018-07-03

## 2018-08-08 ENCOUNTER — APPOINTMENT (OUTPATIENT)
Dept: NEUROLOGY | Facility: CLINIC | Age: 58
End: 2018-08-08
Payer: MEDICARE

## 2018-08-08 VITALS
HEART RATE: 88 BPM | BODY MASS INDEX: 33.23 KG/M2 | OXYGEN SATURATION: 96 % | HEIGHT: 61 IN | DIASTOLIC BLOOD PRESSURE: 86 MMHG | SYSTOLIC BLOOD PRESSURE: 127 MMHG | WEIGHT: 176 LBS

## 2018-08-08 DIAGNOSIS — M51.36 OTHER INTERVERTEBRAL DISC DEGENERATION, LUMBAR REGION: ICD-10-CM

## 2018-08-08 PROCEDURE — 95910 NRV CNDJ TEST 7-8 STUDIES: CPT

## 2018-08-08 PROCEDURE — 95886 MUSC TEST DONE W/N TEST COMP: CPT | Mod: 59

## 2018-08-08 PROCEDURE — 99215 OFFICE O/P EST HI 40 MIN: CPT | Mod: 25

## 2018-08-08 RX ORDER — PREGABALIN 300 MG/1
CAPSULE ORAL
Refills: 0 | Status: ACTIVE | COMMUNITY

## 2018-08-24 LAB
BASOPHILS # BLD AUTO: 0.07 K/UL
BASOPHILS NFR BLD AUTO: 0.9 %
CRP SERPL-MCNC: 1.1 MG/DL
EOSINOPHIL # BLD AUTO: 0.1 K/UL
EOSINOPHIL NFR BLD AUTO: 1.2 %
ERYTHROCYTE [SEDIMENTATION RATE] IN BLOOD BY WESTERGREN METHOD: 11 MM/HR
HCT VFR BLD CALC: 38.7 %
HGB BLD-MCNC: 12.2 G/DL
IMM GRANULOCYTES NFR BLD AUTO: 1.5 %
LYMPHOCYTES # BLD AUTO: 2.35 K/UL
LYMPHOCYTES NFR BLD AUTO: 28.6 %
MAN DIFF?: NORMAL
MCHC RBC-ENTMCNC: 28.2 PG
MCHC RBC-ENTMCNC: 31.5 GM/DL
MCV RBC AUTO: 89.4 FL
MONOCYTES # BLD AUTO: 0.65 K/UL
MONOCYTES NFR BLD AUTO: 7.9 %
NEUTROPHILS # BLD AUTO: 4.93 K/UL
NEUTROPHILS NFR BLD AUTO: 59.9 %
PLATELET # BLD AUTO: 204 K/UL
RBC # BLD: 4.33 M/UL
RBC # FLD: 13.9 %
WBC # FLD AUTO: 8.22 K/UL

## 2019-01-16 ENCOUNTER — FORM ENCOUNTER (OUTPATIENT)
Age: 59
End: 2019-01-16

## 2019-01-17 ENCOUNTER — APPOINTMENT (OUTPATIENT)
Dept: ORTHOPEDIC SURGERY | Facility: CLINIC | Age: 59
End: 2019-01-17
Payer: MEDICARE

## 2019-01-17 ENCOUNTER — OUTPATIENT (OUTPATIENT)
Dept: OUTPATIENT SERVICES | Facility: HOSPITAL | Age: 59
LOS: 1 days | End: 2019-01-17
Payer: MEDICARE

## 2019-01-17 DIAGNOSIS — Z90.710 ACQUIRED ABSENCE OF BOTH CERVIX AND UTERUS: Chronic | ICD-10-CM

## 2019-01-17 DIAGNOSIS — Z98.89 OTHER SPECIFIED POSTPROCEDURAL STATES: Chronic | ICD-10-CM

## 2019-01-17 DIAGNOSIS — M25.50 PAIN IN UNSPECIFIED JOINT: ICD-10-CM

## 2019-01-17 DIAGNOSIS — M25.562 PAIN IN LEFT KNEE: ICD-10-CM

## 2019-01-17 DIAGNOSIS — Z96.89 PRESENCE OF OTHER SPECIFIED FUNCTIONAL IMPLANTS: Chronic | ICD-10-CM

## 2019-01-17 DIAGNOSIS — Z90.89 ACQUIRED ABSENCE OF OTHER ORGANS: Chronic | ICD-10-CM

## 2019-01-17 DIAGNOSIS — M20.40 OTHER HAMMER TOE(S) (ACQUIRED), UNSPECIFIED FOOT: Chronic | ICD-10-CM

## 2019-01-17 DIAGNOSIS — Z96.642 PRESENCE OF LEFT ARTIFICIAL HIP JOINT: Chronic | ICD-10-CM

## 2019-01-17 DIAGNOSIS — E04.9 NONTOXIC GOITER, UNSPECIFIED: Chronic | ICD-10-CM

## 2019-01-17 PROCEDURE — 99213 OFFICE O/P EST LOW 20 MIN: CPT

## 2019-01-17 PROCEDURE — 73564 X-RAY EXAM KNEE 4 OR MORE: CPT

## 2019-01-17 PROCEDURE — 73564 X-RAY EXAM KNEE 4 OR MORE: CPT | Mod: 26,50

## 2019-01-17 NOTE — ADDENDUM
[FreeTextEntry1] : This note was written by Ruthie Ferris on 01/17/2019 acting as scribe for Dr. Case and BRETT Amado.\par \par \par \par

## 2019-01-17 NOTE — DISCUSSION/SUMMARY
[de-identified] : Diagnosis, prognosis and treatment options discussed. Patient presents with severe pain out of proportion to radiographic findings.  I ordered a CT arthrogram to evaluate for occult more severe mechancial pathology.\par Follow up one week after CT arthrogram to discuss results.

## 2019-02-03 ENCOUNTER — FORM ENCOUNTER (OUTPATIENT)
Age: 59
End: 2019-02-03

## 2019-02-04 ENCOUNTER — OUTPATIENT (OUTPATIENT)
Dept: OUTPATIENT SERVICES | Facility: HOSPITAL | Age: 59
LOS: 1 days | End: 2019-02-04
Payer: MEDICARE

## 2019-02-04 ENCOUNTER — APPOINTMENT (OUTPATIENT)
Dept: CT IMAGING | Facility: HOSPITAL | Age: 59
End: 2019-02-04
Payer: MEDICARE

## 2019-02-04 ENCOUNTER — APPOINTMENT (OUTPATIENT)
Dept: RADIOLOGY | Facility: HOSPITAL | Age: 59
End: 2019-02-04
Payer: MEDICARE

## 2019-02-04 DIAGNOSIS — Z98.89 OTHER SPECIFIED POSTPROCEDURAL STATES: Chronic | ICD-10-CM

## 2019-02-04 DIAGNOSIS — Z96.642 PRESENCE OF LEFT ARTIFICIAL HIP JOINT: Chronic | ICD-10-CM

## 2019-02-04 DIAGNOSIS — E04.9 NONTOXIC GOITER, UNSPECIFIED: Chronic | ICD-10-CM

## 2019-02-04 DIAGNOSIS — Z96.89 PRESENCE OF OTHER SPECIFIED FUNCTIONAL IMPLANTS: Chronic | ICD-10-CM

## 2019-02-04 DIAGNOSIS — Z90.89 ACQUIRED ABSENCE OF OTHER ORGANS: Chronic | ICD-10-CM

## 2019-02-04 DIAGNOSIS — M20.40 OTHER HAMMER TOE(S) (ACQUIRED), UNSPECIFIED FOOT: Chronic | ICD-10-CM

## 2019-02-04 DIAGNOSIS — Z90.710 ACQUIRED ABSENCE OF BOTH CERVIX AND UTERUS: Chronic | ICD-10-CM

## 2019-02-04 PROCEDURE — 73701 CT LOWER EXTREMITY W/DYE: CPT

## 2019-02-04 PROCEDURE — 27369 NJX CNTRST KNE ARTHG/CT/MRI: CPT | Mod: LT

## 2019-02-04 PROCEDURE — 73701 CT LOWER EXTREMITY W/DYE: CPT | Mod: 26,LT

## 2019-02-04 PROCEDURE — 73580 CONTRAST X-RAY OF KNEE JOINT: CPT

## 2019-02-04 PROCEDURE — 73580 CONTRAST X-RAY OF KNEE JOINT: CPT | Mod: 26,LT

## 2019-02-04 PROCEDURE — 27369 NJX CNTRST KNE ARTHG/CT/MRI: CPT

## 2019-02-12 ENCOUNTER — APPOINTMENT (OUTPATIENT)
Dept: ORTHOPEDIC SURGERY | Facility: CLINIC | Age: 59
End: 2019-02-12
Payer: MEDICARE

## 2019-02-12 DIAGNOSIS — M17.0 BILATERAL PRIMARY OSTEOARTHRITIS OF KNEE: ICD-10-CM

## 2019-02-12 PROCEDURE — 99213 OFFICE O/P EST LOW 20 MIN: CPT

## 2019-02-12 NOTE — ADDENDUM
[FreeTextEntry1] : This note was written by Ruthie Ferris on 02/12/2019 acting as scribe for Dr. Case and BRETT Amado.\par \par \par \par

## 2019-02-12 NOTE — DISCUSSION/SUMMARY
[de-identified] : Patient presents with bilateral knee pain, left > right, for follow up and CT review. The CT arthrogram showed some degeneration of the knee, more than was shown on X-ray, her pain is substantially out of proportion to the mild to moderate degenerative changes noted and there is no evidence of insufficiency fracture requiring rest or of a severe mechanical pathology amenable to surgical management. I recommended f/u with pain management.  She has not responded well to corticosteroid or HA injections and I do not recommend repeating them at this time. I suggested she do low impact exercise and wrote a prescription for physical therapy because patient said this would make her more confident in exercising. \par Follow up PRN.

## 2019-02-12 NOTE — HISTORY OF PRESENT ILLNESS
[de-identified] : 60 y/o F presents today for follow up of left knee pain and CT arthrogram result review. She reports severe bilateral knee pain and stiffness as well as instability on all surfaces. Patient can walk less than 5 blocks with a moderate limp, uses a cane for support, and ascends/descends stairs with a rail. \par \par History as of 1/17/19:\par She reports severe pain and stiffness bilaterally as well as feeling unstable on al surfaces. Patient is able to walk less than 5 blocks with a slight limp. She uses a cane for ambulation. Patient has had cortisone and gel injections, which did not provide lasting relief.  She has tried PT without relief. \par Of note, patient has been seeing Dr. Jay for pain management. She has a spinal cord stimulator implanted.

## 2019-02-12 NOTE — PHYSICAL EXAM
[de-identified] : Constitutional: Well appearing. No acute distress.\par Mental Status: Alert & oriented to person, place and time. Normal affect.\par Pulmonary: No respiratory distress. Normal chest excursion.\par \par Gait: Normal.\par Ambulatory assist devices: None.\par \par Cervical spine: Skin intact. No visible deformity. Painless active ROM without evident restriction.\par Bilateral upper extremities: Skin intact. No deformity. Painless active ROM without evident restriction.\par Thoracolumbar spine: No deformity. No tenderness. No radicular pain on passive straight leg raise bilaterally.\par Pelvis: No pelvic obliquity. No tenderness.\par Leg lengths: Equal.\par Bilateral Hips: No swelling or deformity. No focal tenderness. Painless and unrestricted range of motion. No crepitation. Hip flexor and abductor power 5/5.\par \par Left Knee:\par No swelling or effusion.\par No deformity.\par + focal tenderness over medial and lateral joint lines\par Painless and unrestricted range of motion. ROM from full extension to 130 degrees of flexion.\par Central patellar tracking.\par + crepitation.\par No instability.\par Quadriceps power 5/5.\par \par  [de-identified] : A CT arthrogram of the left knee done at here on 2/4/19 shows:\par -Findings consistent with tear of the medial meniscus\par -High-grade chondral injuries of the patellofemoral compartment with underlying cystic change.\par \par

## 2019-03-28 ENCOUNTER — OUTPATIENT (OUTPATIENT)
Dept: OUTPATIENT SERVICES | Facility: HOSPITAL | Age: 59
LOS: 1 days | End: 2019-03-28
Payer: MEDICARE

## 2019-03-28 ENCOUNTER — APPOINTMENT (OUTPATIENT)
Dept: CT IMAGING | Facility: HOSPITAL | Age: 59
End: 2019-03-28
Payer: MEDICARE

## 2019-03-28 DIAGNOSIS — Z90.710 ACQUIRED ABSENCE OF BOTH CERVIX AND UTERUS: Chronic | ICD-10-CM

## 2019-03-28 DIAGNOSIS — Z98.89 OTHER SPECIFIED POSTPROCEDURAL STATES: Chronic | ICD-10-CM

## 2019-03-28 DIAGNOSIS — Z96.89 PRESENCE OF OTHER SPECIFIED FUNCTIONAL IMPLANTS: Chronic | ICD-10-CM

## 2019-03-28 DIAGNOSIS — M20.40 OTHER HAMMER TOE(S) (ACQUIRED), UNSPECIFIED FOOT: Chronic | ICD-10-CM

## 2019-03-28 DIAGNOSIS — Z96.642 PRESENCE OF LEFT ARTIFICIAL HIP JOINT: Chronic | ICD-10-CM

## 2019-03-28 DIAGNOSIS — E04.9 NONTOXIC GOITER, UNSPECIFIED: Chronic | ICD-10-CM

## 2019-03-28 DIAGNOSIS — Z90.89 ACQUIRED ABSENCE OF OTHER ORGANS: Chronic | ICD-10-CM

## 2019-03-28 PROCEDURE — 72125 CT NECK SPINE W/O DYE: CPT

## 2019-03-28 PROCEDURE — 72125 CT NECK SPINE W/O DYE: CPT | Mod: 26

## 2019-07-22 ENCOUNTER — APPOINTMENT (OUTPATIENT)
Dept: NEUROLOGY | Facility: CLINIC | Age: 59
End: 2019-07-22

## 2019-07-23 ENCOUNTER — FORM ENCOUNTER (OUTPATIENT)
Age: 59
End: 2019-07-23

## 2019-07-24 ENCOUNTER — OUTPATIENT (OUTPATIENT)
Dept: OUTPATIENT SERVICES | Facility: HOSPITAL | Age: 59
LOS: 1 days | End: 2019-07-24
Payer: MEDICARE

## 2019-07-24 ENCOUNTER — APPOINTMENT (OUTPATIENT)
Dept: SPINE | Facility: CLINIC | Age: 59
End: 2019-07-24
Payer: MEDICARE

## 2019-07-24 VITALS
HEART RATE: 88 BPM | HEIGHT: 61 IN | WEIGHT: 175 LBS | RESPIRATION RATE: 18 BRPM | BODY MASS INDEX: 33.04 KG/M2 | SYSTOLIC BLOOD PRESSURE: 115 MMHG | DIASTOLIC BLOOD PRESSURE: 71 MMHG | OXYGEN SATURATION: 95 %

## 2019-07-24 DIAGNOSIS — Z96.89 PRESENCE OF OTHER SPECIFIED FUNCTIONAL IMPLANTS: Chronic | ICD-10-CM

## 2019-07-24 DIAGNOSIS — Z98.89 OTHER SPECIFIED POSTPROCEDURAL STATES: Chronic | ICD-10-CM

## 2019-07-24 DIAGNOSIS — G90.529 COMPLEX REGIONAL PAIN SYNDROME I OF UNSPECIFIED LOWER LIMB: ICD-10-CM

## 2019-07-24 DIAGNOSIS — Z96.89 PRESENCE OF OTHER SPECIFIED FUNCTIONAL IMPLANTS: ICD-10-CM

## 2019-07-24 DIAGNOSIS — Z90.89 ACQUIRED ABSENCE OF OTHER ORGANS: Chronic | ICD-10-CM

## 2019-07-24 DIAGNOSIS — Z96.642 PRESENCE OF LEFT ARTIFICIAL HIP JOINT: Chronic | ICD-10-CM

## 2019-07-24 DIAGNOSIS — Z90.710 ACQUIRED ABSENCE OF BOTH CERVIX AND UTERUS: Chronic | ICD-10-CM

## 2019-07-24 DIAGNOSIS — M20.40 OTHER HAMMER TOE(S) (ACQUIRED), UNSPECIFIED FOOT: Chronic | ICD-10-CM

## 2019-07-24 DIAGNOSIS — E04.9 NONTOXIC GOITER, UNSPECIFIED: Chronic | ICD-10-CM

## 2019-07-24 PROCEDURE — 99214 OFFICE O/P EST MOD 30 MIN: CPT

## 2019-07-24 PROCEDURE — 72070 X-RAY EXAM THORAC SPINE 2VWS: CPT

## 2019-07-24 PROCEDURE — 72070 X-RAY EXAM THORAC SPINE 2VWS: CPT | Mod: 26

## 2019-09-26 NOTE — ASU PATIENT PROFILE, ADULT - PSH
H/O abdominal hysterectomy    H/O arthroscopy of left knee  2010  H/O cervical discectomy  6/2012  H/O colonoscopy with polypectomy    H/O elbow surgery  1974 - left  H/O partial thyroidectomy  2006  H/O thyroidectomy  complete 2012  Hammertoe  s/p repair 1995  History of back surgery  Cervical and Lumbar Spine, most recently 04/2015  History of carpal tunnel release  Right Carpal Tunnel Release  History of partial hysterectomy    History of tonsillectomy    History of total hip replacement, left  01/2015  S/P rotator cuff repair  2001 - right  Spinal cord stimulator status    Status post arthroscopic knee surgery  Left Knee  Status post rotator cuff repair    Thyroid goiter  s/p resection X 2

## 2019-09-26 NOTE — ASU PATIENT PROFILE, ADULT - NS PRO AD PATIENT TYPE
yahaira walton  [id number]/Health Care Proxy (HCP)/Living Will Health Care Proxy (HCP)/yahaira walton  [phone]/Living Will Living Will/yahaira walton  [phone] MOM/Health Care Proxy (HCP)

## 2019-09-26 NOTE — ASU PATIENT PROFILE, ADULT - TEACHING/LEARNING LEARNING PREFERENCES
verbal instruction/skill demonstration/pictorial/written material/audio written material/individual instruction

## 2019-09-26 NOTE — ASU PATIENT PROFILE, ADULT - PMH
Anxiety  panic attacks  Asthma  deniies recent attack, or hospitalization  Disc degeneration, lumbosacral    Fibromyalgia    GERD (gastroesophageal reflux disease)    Hypercholesteremia    Hypothyroidism    IBS (irritable bowel syndrome)    Insomnia    Leiomyoma    Lumbar spondylosis    Osteoarthritis    Osteoporosis    Rotator cuff injury  right  Thyroid nodule Anxiety  panic attacks  Anxiety  /Depression  Asthma  deniies recent attack, or hospitalization  Asthma    Diabetes mellitus    Disc degeneration, lumbosacral    Fibromyalgia    GERD (gastroesophageal reflux disease)    GERD (gastroesophageal reflux disease)    HLD (hyperlipidemia)    Hypercholesteremia    Hypothyroid    Hypothyroidism    IBS (irritable bowel syndrome)    IBS (irritable bowel syndrome)    Insomnia    Leiomyoma    Lower back pain  Chronic  Lumbar spondylosis    OA (osteoarthritis)    Osteoarthritis    Osteoporosis    Rotator cuff injury  right  Thyroid nodule Anxiety  /Depression  Anxiety  panic attacks  Asthma  denies recent attack, or hospitalization  Diabetes mellitus    Disc degeneration, lumbosacral    Fibromyalgia    GERD (gastroesophageal reflux disease)    HLD (hyperlipidemia)    Hypercholesteremia    Hypothyroidism    IBS (irritable bowel syndrome)    IBS (irritable bowel syndrome)    Insomnia    Leiomyoma    Lower back pain  Chronic  Lumbar spondylosis    OA (osteoarthritis)    Osteoarthritis    Osteoporosis    Rotator cuff injury  right  Thyroid nodule

## 2019-09-26 NOTE — ASU PATIENT PROFILE, ADULT - PROVIDER NOTIFICATION
Problem: Physical Therapy Goal  Goal: Physical Therapy Goal  Goals to be met by: 2018     Patient will increase functional independence with mobility by performin. Supine to sit with [place]- MET 2/15/2018  2. Sit to supine with [place]  3. Sit to stand transfer with Modified [place] using AD or No AD- MET 2/15/2018  4. Bed to chair transfer with Modified [place] using AD or No AD  5. Gait x150 feet with Stand-by Assistance using AD or No AD- MET 2/15/2018  Revised 2/15/2018: Gait x200ft w/ RW and SPV  6. Stand for x10 minutes with Stand-by Assistance while performing dynamic balance tasks  7. Lower extremity exercise program x15 reps per handout, with assistance as needed     Outcome: Ongoing (interventions implemented as appropriate)  LTGs remain appropriate. Pt will continue PT POC.    Joana Kathleen, PT  2/15/2018         Declines

## 2019-09-27 ENCOUNTER — APPOINTMENT (OUTPATIENT)
Dept: SPINE | Facility: HOSPITAL | Age: 59
End: 2019-09-27

## 2019-09-27 ENCOUNTER — RESULT REVIEW (OUTPATIENT)
Age: 59
End: 2019-09-27

## 2019-09-27 ENCOUNTER — OUTPATIENT (OUTPATIENT)
Dept: OUTPATIENT SERVICES | Facility: HOSPITAL | Age: 59
LOS: 1 days | Discharge: HOME | End: 2019-09-27
Payer: MEDICARE

## 2019-09-27 VITALS
HEART RATE: 85 BPM | HEIGHT: 61 IN | OXYGEN SATURATION: 96 % | TEMPERATURE: 98 F | SYSTOLIC BLOOD PRESSURE: 121 MMHG | DIASTOLIC BLOOD PRESSURE: 79 MMHG | RESPIRATION RATE: 18 BRPM | WEIGHT: 173.94 LBS

## 2019-09-27 VITALS
RESPIRATION RATE: 19 BRPM | SYSTOLIC BLOOD PRESSURE: 111 MMHG | OXYGEN SATURATION: 95 % | HEART RATE: 96 BPM | DIASTOLIC BLOOD PRESSURE: 85 MMHG

## 2019-09-27 DIAGNOSIS — Z98.89 OTHER SPECIFIED POSTPROCEDURAL STATES: Chronic | ICD-10-CM

## 2019-09-27 DIAGNOSIS — Z90.89 ACQUIRED ABSENCE OF OTHER ORGANS: Chronic | ICD-10-CM

## 2019-09-27 DIAGNOSIS — Z96.89 PRESENCE OF OTHER SPECIFIED FUNCTIONAL IMPLANTS: Chronic | ICD-10-CM

## 2019-09-27 DIAGNOSIS — M20.40 OTHER HAMMER TOE(S) (ACQUIRED), UNSPECIFIED FOOT: Chronic | ICD-10-CM

## 2019-09-27 DIAGNOSIS — E04.9 NONTOXIC GOITER, UNSPECIFIED: Chronic | ICD-10-CM

## 2019-09-27 DIAGNOSIS — Z90.710 ACQUIRED ABSENCE OF BOTH CERVIX AND UTERUS: Chronic | ICD-10-CM

## 2019-09-27 DIAGNOSIS — Z96.642 PRESENCE OF LEFT ARTIFICIAL HIP JOINT: Chronic | ICD-10-CM

## 2019-09-27 LAB
ANION GAP SERPL CALC-SCNC: 13 MMOL/L — SIGNIFICANT CHANGE UP (ref 5–17)
BUN SERPL-MCNC: 22 MG/DL — SIGNIFICANT CHANGE UP (ref 7–23)
CALCIUM SERPL-MCNC: 9.3 MG/DL — SIGNIFICANT CHANGE UP (ref 8.4–10.5)
CHLORIDE SERPL-SCNC: 104 MMOL/L — SIGNIFICANT CHANGE UP (ref 96–108)
CO2 SERPL-SCNC: 27 MMOL/L — SIGNIFICANT CHANGE UP (ref 22–31)
CREAT SERPL-MCNC: 0.96 MG/DL — SIGNIFICANT CHANGE UP (ref 0.5–1.3)
GLUCOSE BLDC GLUCOMTR-MCNC: 157 MG/DL — HIGH (ref 70–99)
GLUCOSE BLDC GLUCOMTR-MCNC: 94 MG/DL — SIGNIFICANT CHANGE UP (ref 70–99)
GLUCOSE SERPL-MCNC: 106 MG/DL — HIGH (ref 70–99)
POTASSIUM SERPL-MCNC: 4.4 MMOL/L — SIGNIFICANT CHANGE UP (ref 3.5–5.3)
POTASSIUM SERPL-SCNC: 4.4 MMOL/L — SIGNIFICANT CHANGE UP (ref 3.5–5.3)
SODIUM SERPL-SCNC: 144 MMOL/L — SIGNIFICANT CHANGE UP (ref 135–145)

## 2019-09-27 PROCEDURE — 88300 SURGICAL PATH GROSS: CPT

## 2019-09-27 PROCEDURE — 63685 INS/RPLC SPI NPG/RCVR POCKET: CPT

## 2019-09-27 PROCEDURE — 82962 GLUCOSE BLOOD TEST: CPT

## 2019-09-27 PROCEDURE — 76000 FLUOROSCOPY <1 HR PHYS/QHP: CPT

## 2019-09-27 PROCEDURE — C1889: CPT

## 2019-09-27 PROCEDURE — 36415 COLL VENOUS BLD VENIPUNCTURE: CPT

## 2019-09-27 PROCEDURE — C1778: CPT

## 2019-09-27 PROCEDURE — 63650 IMPLANT NEUROELECTRODES: CPT

## 2019-09-27 PROCEDURE — 80048 BASIC METABOLIC PNL TOTAL CA: CPT

## 2019-09-27 PROCEDURE — C1767: CPT

## 2019-09-27 RX ORDER — CEPHALEXIN 500 MG
1 CAPSULE ORAL
Qty: 20 | Refills: 0
Start: 2019-09-27 | End: 2019-10-01

## 2019-09-27 RX ORDER — SODIUM CHLORIDE 9 MG/ML
1000 INJECTION, SOLUTION INTRAVENOUS
Refills: 0 | Status: DISCONTINUED | OUTPATIENT
Start: 2019-09-27 | End: 2019-09-27

## 2019-09-27 RX ORDER — BUPIVACAINE 13.3 MG/ML
20 INJECTION, SUSPENSION, LIPOSOMAL INFILTRATION ONCE
Refills: 0 | Status: DISCONTINUED | OUTPATIENT
Start: 2019-09-27 | End: 2019-09-27

## 2019-09-27 RX ORDER — TRAMADOL HYDROCHLORIDE 50 MG/1
25 TABLET ORAL ONCE
Refills: 0 | Status: DISCONTINUED | OUTPATIENT
Start: 2019-09-27 | End: 2019-09-27

## 2019-09-27 RX ADMIN — SODIUM CHLORIDE 75 MILLILITER(S): 9 INJECTION, SOLUTION INTRAVENOUS at 12:17

## 2019-09-27 NOTE — ASU PREOP CHECKLIST - NOTHING BY MOUTH SINCE
Research Consent Checklist    Protocol: Testing the Addition of Two Approved Drugs, Olanzapine or Prochlorperazine, for Persistent Nausea  Consent version date: 2/15/19  The following were discussed with the patient:      Purpose of the study - who are the participants:  YES  Study design - schedule of the trial:  YES  Schedule of study tests:  YES  Study specific questionnaires:  yes  Potential side effects:  YES  All trial medication disclosed:  YES  Follow-up schedule after active treatment:  yes  Confidentiality:  YES    Birth control discussed:  NO       female      Type of birth control to be used: N/A postmenopausal  Pregnancy while on the trial:  n/a  Voluntary participation / withdrawal:  YES  Alternative options to the clinical trial:  YES  Potential benefits of a clinical trial:  YES  The screening process:  YES  The randomization process:  YES  Re-consenting upon new findings:  YES  Blinding / un-blinding:  n/a  Research department phone numbers:  YES  Contacts for patient after hours / weekends:  YES  Contact research staff if hospitalized / emergency room visit:  YES  No payment for being in the study:  YES  No screening tests strictly for research performed prior to consent:  YES    All questions answered:  YES   was not required          name and language:  N/A  HIPAA information included in consent:  YES    Consent signed, dated and witnessed (if applicable) on 03/22/19  Copy of consent given to patient:  YES  Original consent placed in research chart:  YES  Copy of consent scanned into patient's electronic medical record:  YES  LEE BARNEY                 
26-Sep-2019 22:00

## 2019-09-27 NOTE — PACU DISCHARGE NOTE - COMMENTS
Patient meets criteria for patient discharge, A&Ox4, VSS, Dressing to lower back clean, dry and intact, Patient has reported no pain, discharge instructions reviewed with patient and mom, Patient acknowledges understanding by read back,  no further questions by patient or mom, Patient escorted to lobby with wheelchair with mom and will take a cab home.

## 2019-09-28 PROBLEM — E11.9 TYPE 2 DIABETES MELLITUS WITHOUT COMPLICATIONS: Chronic | Status: ACTIVE | Noted: 2019-09-26

## 2019-10-03 LAB — SURGICAL PATHOLOGY STUDY: SIGNIFICANT CHANGE UP

## 2019-10-15 ENCOUNTER — APPOINTMENT (OUTPATIENT)
Dept: SPINE | Facility: CLINIC | Age: 59
End: 2019-10-15
Payer: MEDICARE

## 2019-10-15 VITALS
BODY MASS INDEX: 33.04 KG/M2 | WEIGHT: 175 LBS | HEART RATE: 90 BPM | SYSTOLIC BLOOD PRESSURE: 110 MMHG | HEIGHT: 61 IN | OXYGEN SATURATION: 96 % | RESPIRATION RATE: 18 BRPM | TEMPERATURE: 98.2 F | DIASTOLIC BLOOD PRESSURE: 79 MMHG

## 2019-10-15 DIAGNOSIS — Z09 ENCOUNTER FOR FOLLOW-UP EXAMINATION AFTER COMPLETED TREATMENT FOR CONDITIONS OTHER THAN MALIGNANT NEOPLASM: ICD-10-CM

## 2019-10-15 DIAGNOSIS — Z48.02 ENCOUNTER FOR REMOVAL OF SUTURES: ICD-10-CM

## 2019-10-15 PROCEDURE — 99024 POSTOP FOLLOW-UP VISIT: CPT

## 2019-11-04 ENCOUNTER — APPOINTMENT (OUTPATIENT)
Dept: CT IMAGING | Facility: HOSPITAL | Age: 59
End: 2019-11-04

## 2019-11-04 ENCOUNTER — OUTPATIENT (OUTPATIENT)
Dept: OUTPATIENT SERVICES | Facility: HOSPITAL | Age: 59
LOS: 1 days | End: 2019-11-04
Payer: MEDICARE

## 2019-11-04 DIAGNOSIS — Z98.89 OTHER SPECIFIED POSTPROCEDURAL STATES: Chronic | ICD-10-CM

## 2019-11-04 DIAGNOSIS — E04.9 NONTOXIC GOITER, UNSPECIFIED: Chronic | ICD-10-CM

## 2019-11-04 DIAGNOSIS — Z96.642 PRESENCE OF LEFT ARTIFICIAL HIP JOINT: Chronic | ICD-10-CM

## 2019-11-04 DIAGNOSIS — Z90.89 ACQUIRED ABSENCE OF OTHER ORGANS: Chronic | ICD-10-CM

## 2019-11-04 DIAGNOSIS — Z96.89 PRESENCE OF OTHER SPECIFIED FUNCTIONAL IMPLANTS: Chronic | ICD-10-CM

## 2019-11-04 DIAGNOSIS — Z90.710 ACQUIRED ABSENCE OF BOTH CERVIX AND UTERUS: Chronic | ICD-10-CM

## 2019-11-04 DIAGNOSIS — M20.40 OTHER HAMMER TOE(S) (ACQUIRED), UNSPECIFIED FOOT: Chronic | ICD-10-CM

## 2019-11-04 PROCEDURE — 73700 CT LOWER EXTREMITY W/O DYE: CPT | Mod: 26,RT,76

## 2019-11-04 PROCEDURE — 73700 CT LOWER EXTREMITY W/O DYE: CPT

## 2020-05-28 NOTE — ED ADULT NURSE NOTE - TEMPLATE LIST FOR HEAD TO TOE ASSESSMENT
"Date of procedure: 5/28/2020  This patient has a history of a vaginal hysterectomy/bilateral salpingo-oophorectomy/anterior repair at the [facility] by Dr. Davida Beckwith.  Shortly after this procedure she was diagnosed with vaginal vault prolapse and a cystocele.  This was untreated for several years.  In November 2019 Dr. Don Moore did a proctoscopy-sigmoid colectomy/vaginal pexy/umbilical herniorrhaphy/omental flap procedure for rectal prolapse.  The vaginal pexy did not correct her vaginal vault prolapse, and I fitted her with a #5 ring pessary with support.  This pessary was expelled and I replaced it with a #6 ring pessary with support which has corrected her vaginal vault prolapse.  She does have some pressure from the pessary because her cardiologist has suggested that she not use estrogen vaginal cream since she has a history of 2 postoperative pulmonary emboli, following her procedure in November 2019.  She is using Replens on an intermittent basis.  She has no bowel or urinary symptoms.  Laboratory evaluation in January 2020 confirmed iron deficiency anemia.  Her creatinine was 1.38.    Risks and benefits discussed? yes  All questions answered? yes      A comprehensive review of systems was done.  She denies fever, cough, and shortness of breath.  Constitutional: negative for fever, chills, activity change, appetite change, fatigue and unexpected weight change.  Respiratory: negative  Cardiovascular: negative  Gastrointestinal: negative  Genitourinary:negative  Musculoskeletal:negative  Behavioral/Psych: negative    /78   Ht 157.5 cm (62\")   Wt 73.8 kg (162 lb 9.6 oz)   Breastfeeding No   BMI 29.74 kg/m²   Lungs- Clear to auscultation  Heart- RRR with no murmur, rub or gallop  Abdomen- Soft, with colostomy bag in LLQ.  No organomegaly     Clinical staff was present for exam  External genitalia:  normal appearance of the external genitalia including Bartholin's and Schleswig's " glands.  Vaginal:  atrophic mucosal changes are present;  Cervix:  absent.  Uterus:  absent.  Adnexa:  absent, bilateral.  Vaginal vault prolapse GRADE 3     The pessary was removed, cleaned and replaced.    Foldable ring w/ support - #6 w/o urethral bar    ASSESSMENT  Problems Addressed this Visit        Genitourinary    Vaginal vault prolapse after hysterectomy - Primary       Other    S/P partial colectomy           Vaginal atrophy    PLAN   1. Medications prescribed this encounter:  No orders of the defined types were placed in this encounter.  2. Continued use of #6 ring pessary with support to correct vaginal vault prolapse  3. Follow up: 4 month(s)  *Please note that portions of this documentation may have been completed with a voice recognition program.  Efforts were made to edit this dictation, but occasional words may have been mistranscribed.    Post procedure instructions: Call ASAP if increasing pain or trouble passing urine or   bowels    Counseling was given to patient for the following topics: diagnostic results, instructions for management, risk factor reductions, impressions, risks and benefits of treatment options and importance of treatment compliance . Total time of the encounter was 21 minute(s) and 12 minute(s)  was spent counseling.  I have advised the patient that the pessary is effectively correcting her vaginal vault prolapse.  I have counseled the patient that she does have significant vaginal atrophy and it would be preferable if she could use a very low dose of estradiol vaginal cream, 0.5 g weekly.  She will discuss this with her cardiologist.  In the meantime she will increase the use of Replens vaginal moisturizer to every other day.  I have counseled her to contact me if she has any difficulty with voiding or defecation.  I have advised her to follow-up with her primary care physician regarding her anemia.      *Please note that portions of this documentation may have been completed  with a voice recognition program.  Efforts were made to edit this dictation, but occasional words may have been mistranscribed.  This note was electronically signed.    VINICIUS Nicholson MD  May 28, 2020  14:41   VIEW ALL

## 2020-09-23 ENCOUNTER — RESULT REVIEW (OUTPATIENT)
Age: 60
End: 2020-09-23

## 2020-09-23 ENCOUNTER — APPOINTMENT (OUTPATIENT)
Dept: ULTRASOUND IMAGING | Facility: HOSPITAL | Age: 60
End: 2020-09-23
Payer: MEDICARE

## 2020-09-23 ENCOUNTER — OUTPATIENT (OUTPATIENT)
Dept: OUTPATIENT SERVICES | Facility: HOSPITAL | Age: 60
LOS: 1 days | End: 2020-09-23
Payer: MEDICARE

## 2020-09-23 DIAGNOSIS — Z98.89 OTHER SPECIFIED POSTPROCEDURAL STATES: Chronic | ICD-10-CM

## 2020-09-23 DIAGNOSIS — M20.40 OTHER HAMMER TOE(S) (ACQUIRED), UNSPECIFIED FOOT: Chronic | ICD-10-CM

## 2020-09-23 DIAGNOSIS — Z96.642 PRESENCE OF LEFT ARTIFICIAL HIP JOINT: Chronic | ICD-10-CM

## 2020-09-23 DIAGNOSIS — Z90.89 ACQUIRED ABSENCE OF OTHER ORGANS: Chronic | ICD-10-CM

## 2020-09-23 DIAGNOSIS — Z90.710 ACQUIRED ABSENCE OF BOTH CERVIX AND UTERUS: Chronic | ICD-10-CM

## 2020-09-23 DIAGNOSIS — Z96.89 PRESENCE OF OTHER SPECIFIED FUNCTIONAL IMPLANTS: Chronic | ICD-10-CM

## 2020-09-23 DIAGNOSIS — E04.9 NONTOXIC GOITER, UNSPECIFIED: Chronic | ICD-10-CM

## 2020-09-23 PROCEDURE — 76536 US EXAM OF HEAD AND NECK: CPT

## 2020-09-23 PROCEDURE — 76536 US EXAM OF HEAD AND NECK: CPT | Mod: 26

## 2020-12-17 ENCOUNTER — APPOINTMENT (OUTPATIENT)
Dept: CT IMAGING | Facility: HOSPITAL | Age: 60
End: 2020-12-17
Payer: MEDICARE

## 2020-12-17 ENCOUNTER — OUTPATIENT (OUTPATIENT)
Dept: OUTPATIENT SERVICES | Facility: HOSPITAL | Age: 60
LOS: 1 days | End: 2020-12-17
Payer: MEDICARE

## 2020-12-17 DIAGNOSIS — Z98.89 OTHER SPECIFIED POSTPROCEDURAL STATES: Chronic | ICD-10-CM

## 2020-12-17 DIAGNOSIS — Z96.89 PRESENCE OF OTHER SPECIFIED FUNCTIONAL IMPLANTS: Chronic | ICD-10-CM

## 2020-12-17 DIAGNOSIS — M20.40 OTHER HAMMER TOE(S) (ACQUIRED), UNSPECIFIED FOOT: Chronic | ICD-10-CM

## 2020-12-17 DIAGNOSIS — E04.9 NONTOXIC GOITER, UNSPECIFIED: Chronic | ICD-10-CM

## 2020-12-17 DIAGNOSIS — Z96.642 PRESENCE OF LEFT ARTIFICIAL HIP JOINT: Chronic | ICD-10-CM

## 2020-12-17 DIAGNOSIS — Z90.89 ACQUIRED ABSENCE OF OTHER ORGANS: Chronic | ICD-10-CM

## 2020-12-17 DIAGNOSIS — Z90.710 ACQUIRED ABSENCE OF BOTH CERVIX AND UTERUS: Chronic | ICD-10-CM

## 2020-12-17 PROCEDURE — 72131 CT LUMBAR SPINE W/O DYE: CPT

## 2020-12-17 PROCEDURE — 72131 CT LUMBAR SPINE W/O DYE: CPT | Mod: 26,MH

## 2021-09-20 ENCOUNTER — APPOINTMENT (OUTPATIENT)
Dept: ULTRASOUND IMAGING | Facility: HOSPITAL | Age: 61
End: 2021-09-20
Payer: MEDICARE

## 2021-09-20 ENCOUNTER — OUTPATIENT (OUTPATIENT)
Dept: OUTPATIENT SERVICES | Facility: HOSPITAL | Age: 61
LOS: 1 days | End: 2021-09-20
Payer: MEDICARE

## 2021-09-20 ENCOUNTER — RESULT REVIEW (OUTPATIENT)
Age: 61
End: 2021-09-20

## 2021-09-20 DIAGNOSIS — Z98.89 OTHER SPECIFIED POSTPROCEDURAL STATES: Chronic | ICD-10-CM

## 2021-09-20 DIAGNOSIS — E04.9 NONTOXIC GOITER, UNSPECIFIED: Chronic | ICD-10-CM

## 2021-09-20 DIAGNOSIS — Z90.89 ACQUIRED ABSENCE OF OTHER ORGANS: Chronic | ICD-10-CM

## 2021-09-20 DIAGNOSIS — M20.40 OTHER HAMMER TOE(S) (ACQUIRED), UNSPECIFIED FOOT: Chronic | ICD-10-CM

## 2021-09-20 DIAGNOSIS — Z96.642 PRESENCE OF LEFT ARTIFICIAL HIP JOINT: Chronic | ICD-10-CM

## 2021-09-20 DIAGNOSIS — Z90.710 ACQUIRED ABSENCE OF BOTH CERVIX AND UTERUS: Chronic | ICD-10-CM

## 2021-09-20 DIAGNOSIS — Z96.89 PRESENCE OF OTHER SPECIFIED FUNCTIONAL IMPLANTS: Chronic | ICD-10-CM

## 2021-09-20 PROCEDURE — 76536 US EXAM OF HEAD AND NECK: CPT | Mod: 26

## 2021-09-20 PROCEDURE — 76536 US EXAM OF HEAD AND NECK: CPT

## 2022-04-26 ENCOUNTER — NON-APPOINTMENT (OUTPATIENT)
Age: 62
End: 2022-04-26

## 2022-05-03 ENCOUNTER — APPOINTMENT (OUTPATIENT)
Dept: CT IMAGING | Facility: HOSPITAL | Age: 62
End: 2022-05-03
Payer: MEDICARE

## 2022-05-03 ENCOUNTER — OUTPATIENT (OUTPATIENT)
Dept: OUTPATIENT SERVICES | Facility: HOSPITAL | Age: 62
LOS: 1 days | End: 2022-05-03
Payer: MEDICARE

## 2022-05-03 DIAGNOSIS — Z98.89 OTHER SPECIFIED POSTPROCEDURAL STATES: Chronic | ICD-10-CM

## 2022-05-03 DIAGNOSIS — Z90.89 ACQUIRED ABSENCE OF OTHER ORGANS: Chronic | ICD-10-CM

## 2022-05-03 DIAGNOSIS — Z96.89 PRESENCE OF OTHER SPECIFIED FUNCTIONAL IMPLANTS: Chronic | ICD-10-CM

## 2022-05-03 DIAGNOSIS — M20.40 OTHER HAMMER TOE(S) (ACQUIRED), UNSPECIFIED FOOT: Chronic | ICD-10-CM

## 2022-05-03 DIAGNOSIS — Z90.710 ACQUIRED ABSENCE OF BOTH CERVIX AND UTERUS: Chronic | ICD-10-CM

## 2022-05-03 DIAGNOSIS — Z96.642 PRESENCE OF LEFT ARTIFICIAL HIP JOINT: Chronic | ICD-10-CM

## 2022-05-03 DIAGNOSIS — E04.9 NONTOXIC GOITER, UNSPECIFIED: Chronic | ICD-10-CM

## 2022-05-03 PROCEDURE — 72125 CT NECK SPINE W/O DYE: CPT | Mod: MH

## 2022-05-03 PROCEDURE — 72125 CT NECK SPINE W/O DYE: CPT | Mod: 26,MH

## 2022-05-03 PROCEDURE — 72128 CT CHEST SPINE W/O DYE: CPT | Mod: 26,MH

## 2022-05-03 PROCEDURE — 72131 CT LUMBAR SPINE W/O DYE: CPT | Mod: MH

## 2022-05-03 PROCEDURE — 72128 CT CHEST SPINE W/O DYE: CPT | Mod: MH

## 2022-05-03 PROCEDURE — 72131 CT LUMBAR SPINE W/O DYE: CPT | Mod: 26,MH

## 2022-05-09 DIAGNOSIS — M50.223 OTHER CERVICAL DISC DISPLACEMENT AT C6-C7 LEVEL: ICD-10-CM

## 2022-05-09 DIAGNOSIS — M47.814 SPONDYLOSIS WITHOUT MYELOPATHY OR RADICULOPATHY, THORACIC REGION: ICD-10-CM

## 2022-05-09 DIAGNOSIS — M25.78 OSTEOPHYTE, VERTEBRAE: ICD-10-CM

## 2022-05-09 DIAGNOSIS — M47.816 SPONDYLOSIS WITHOUT MYELOPATHY OR RADICULOPATHY, LUMBAR REGION: ICD-10-CM

## 2022-05-09 DIAGNOSIS — M51.26 OTHER INTERVERTEBRAL DISC DISPLACEMENT, LUMBAR REGION: ICD-10-CM

## 2022-05-09 DIAGNOSIS — M54.2 CERVICALGIA: ICD-10-CM

## 2022-05-09 DIAGNOSIS — M48.02 SPINAL STENOSIS, CERVICAL REGION: ICD-10-CM

## 2022-05-23 ENCOUNTER — RESULT REVIEW (OUTPATIENT)
Age: 62
End: 2022-05-23

## 2022-05-23 DIAGNOSIS — M96.1 POSTLAMINECTOMY SYNDROME, NOT ELSEWHERE CLASSIFIED: ICD-10-CM

## 2022-05-24 ENCOUNTER — NON-APPOINTMENT (OUTPATIENT)
Age: 62
End: 2022-05-24

## 2022-05-24 ENCOUNTER — APPOINTMENT (OUTPATIENT)
Dept: SPINE | Facility: CLINIC | Age: 62
End: 2022-05-24
Payer: MEDICARE

## 2022-05-24 ENCOUNTER — OUTPATIENT (OUTPATIENT)
Dept: OUTPATIENT SERVICES | Facility: HOSPITAL | Age: 62
LOS: 1 days | End: 2022-05-24
Payer: MEDICARE

## 2022-05-24 VITALS
RESPIRATION RATE: 18 BRPM | HEIGHT: 61 IN | SYSTOLIC BLOOD PRESSURE: 116 MMHG | BODY MASS INDEX: 33.04 KG/M2 | WEIGHT: 175 LBS | DIASTOLIC BLOOD PRESSURE: 78 MMHG | OXYGEN SATURATION: 98 % | HEART RATE: 69 BPM

## 2022-05-24 DIAGNOSIS — Z90.710 ACQUIRED ABSENCE OF BOTH CERVIX AND UTERUS: Chronic | ICD-10-CM

## 2022-05-24 DIAGNOSIS — Z90.89 ACQUIRED ABSENCE OF OTHER ORGANS: Chronic | ICD-10-CM

## 2022-05-24 DIAGNOSIS — Z98.89 OTHER SPECIFIED POSTPROCEDURAL STATES: Chronic | ICD-10-CM

## 2022-05-24 DIAGNOSIS — Z96.642 PRESENCE OF LEFT ARTIFICIAL HIP JOINT: Chronic | ICD-10-CM

## 2022-05-24 DIAGNOSIS — M20.40 OTHER HAMMER TOE(S) (ACQUIRED), UNSPECIFIED FOOT: Chronic | ICD-10-CM

## 2022-05-24 DIAGNOSIS — E04.9 NONTOXIC GOITER, UNSPECIFIED: Chronic | ICD-10-CM

## 2022-05-24 DIAGNOSIS — M47.817 SPONDYLOSIS W/OUT MYELOPATHY OR RADICULOPATHY, LUMBOSACRAL REGION: ICD-10-CM

## 2022-05-24 DIAGNOSIS — Z96.89 PRESENCE OF OTHER SPECIFIED FUNCTIONAL IMPLANTS: Chronic | ICD-10-CM

## 2022-05-24 DIAGNOSIS — M47.812 SPONDYLOSIS W/OUT MYELOPATHY OR RADICULOPATHY, CERVICAL REGION: ICD-10-CM

## 2022-05-24 PROCEDURE — 72052 X-RAY EXAM NECK SPINE 6/>VWS: CPT | Mod: 26

## 2022-05-24 PROCEDURE — 72114 X-RAY EXAM L-S SPINE BENDING: CPT | Mod: 26

## 2022-05-24 PROCEDURE — 99215 OFFICE O/P EST HI 40 MIN: CPT

## 2022-05-24 PROCEDURE — 72114 X-RAY EXAM L-S SPINE BENDING: CPT

## 2022-05-24 PROCEDURE — 72052 X-RAY EXAM NECK SPINE 6/>VWS: CPT

## 2022-05-24 NOTE — HISTORY OF PRESENT ILLNESS
[de-identified] : Patient is a 62-year-old woman who was previously seen my colleagues in orthopedic surgery and neurosurgery who had an ALIF as well as thoracolumbar spinal stimulator she is having worsening pain and saw a physician at [facility] who recommended surgery

## 2022-05-24 NOTE — DISCUSSION/SUMMARY
[de-identified] : I would like her to obtain an EOS x-ray as well as a CT myelogram of her whole spine once is complete I will see her back in the office.\par \par Ambrosio Olivo M.D., M.Sc.\par \par Department of Neurosurgery\par [facility]\par [facility]\par [facility]\par [place], [place]\par [email or web address]\par [phone]

## 2022-05-25 ENCOUNTER — NON-APPOINTMENT (OUTPATIENT)
Age: 62
End: 2022-05-25

## 2022-05-25 LAB
ALBUMIN SERPL ELPH-MCNC: 4.1 G/DL
ALP BLD-CCNC: 122 U/L
ALT SERPL-CCNC: 22 U/L
ANION GAP SERPL CALC-SCNC: 12 MMOL/L
APTT BLD: 32.6 SEC
AST SERPL-CCNC: 18 U/L
BASOPHILS # BLD AUTO: 0.08 K/UL
BASOPHILS NFR BLD AUTO: 1.1 %
BILIRUB SERPL-MCNC: 0.4 MG/DL
BUN SERPL-MCNC: 14 MG/DL
CALCIUM SERPL-MCNC: 8.9 MG/DL
CHLORIDE SERPL-SCNC: 107 MMOL/L
CO2 SERPL-SCNC: 25 MMOL/L
CREAT SERPL-MCNC: 0.88 MG/DL
EGFR: 74 ML/MIN/1.73M2
EOSINOPHIL # BLD AUTO: 0.11 K/UL
EOSINOPHIL NFR BLD AUTO: 1.5 %
GLUCOSE SERPL-MCNC: 98 MG/DL
HCT VFR BLD CALC: 41 %
HGB BLD-MCNC: 13.2 G/DL
IMM GRANULOCYTES NFR BLD AUTO: 0.4 %
INR PPP: 1.03 RATIO
LYMPHOCYTES # BLD AUTO: 1.82 K/UL
LYMPHOCYTES NFR BLD AUTO: 24.5 %
MAN DIFF?: NORMAL
MCHC RBC-ENTMCNC: 28.9 PG
MCHC RBC-ENTMCNC: 32.2 GM/DL
MCV RBC AUTO: 89.9 FL
MONOCYTES # BLD AUTO: 0.62 K/UL
MONOCYTES NFR BLD AUTO: 8.3 %
NEUTROPHILS # BLD AUTO: 4.78 K/UL
NEUTROPHILS NFR BLD AUTO: 64.2 %
PLATELET # BLD AUTO: 165 K/UL
POTASSIUM SERPL-SCNC: 4.6 MMOL/L
PROT SERPL-MCNC: 6.8 G/DL
PT BLD: 12.1 SEC
RBC # BLD: 4.56 M/UL
RBC # FLD: 12.7 %
SODIUM SERPL-SCNC: 144 MMOL/L
WBC # FLD AUTO: 7.44 K/UL

## 2022-06-09 ENCOUNTER — LABORATORY RESULT (OUTPATIENT)
Age: 62
End: 2022-06-09

## 2022-06-13 ENCOUNTER — APPOINTMENT (OUTPATIENT)
Dept: INTERVENTIONAL RADIOLOGY/VASCULAR | Facility: HOSPITAL | Age: 62
End: 2022-06-13

## 2022-06-13 ENCOUNTER — APPOINTMENT (OUTPATIENT)
Dept: CT IMAGING | Facility: HOSPITAL | Age: 62
End: 2022-06-13

## 2022-06-13 ENCOUNTER — RESULT REVIEW (OUTPATIENT)
Age: 62
End: 2022-06-13

## 2022-06-13 ENCOUNTER — OUTPATIENT (OUTPATIENT)
Dept: OUTPATIENT SERVICES | Facility: HOSPITAL | Age: 62
LOS: 1 days | End: 2022-06-13
Payer: MEDICARE

## 2022-06-13 DIAGNOSIS — Z90.710 ACQUIRED ABSENCE OF BOTH CERVIX AND UTERUS: Chronic | ICD-10-CM

## 2022-06-13 DIAGNOSIS — Z98.89 OTHER SPECIFIED POSTPROCEDURAL STATES: Chronic | ICD-10-CM

## 2022-06-13 DIAGNOSIS — Z96.642 PRESENCE OF LEFT ARTIFICIAL HIP JOINT: Chronic | ICD-10-CM

## 2022-06-13 DIAGNOSIS — Z96.89 PRESENCE OF OTHER SPECIFIED FUNCTIONAL IMPLANTS: Chronic | ICD-10-CM

## 2022-06-13 DIAGNOSIS — E04.9 NONTOXIC GOITER, UNSPECIFIED: Chronic | ICD-10-CM

## 2022-06-13 DIAGNOSIS — M20.40 OTHER HAMMER TOE(S) (ACQUIRED), UNSPECIFIED FOOT: Chronic | ICD-10-CM

## 2022-06-13 DIAGNOSIS — Z90.89 ACQUIRED ABSENCE OF OTHER ORGANS: Chronic | ICD-10-CM

## 2022-06-13 PROCEDURE — 72129 CT CHEST SPINE W/DYE: CPT | Mod: 26,ME

## 2022-06-13 PROCEDURE — G1004: CPT

## 2022-06-13 PROCEDURE — 99152 MOD SED SAME PHYS/QHP 5/>YRS: CPT

## 2022-06-13 PROCEDURE — 72132 CT LUMBAR SPINE W/DYE: CPT | Mod: 26,ME

## 2022-06-13 PROCEDURE — 77003 FLUOROGUIDE FOR SPINE INJECT: CPT | Mod: 26

## 2022-06-13 PROCEDURE — 72126 CT NECK SPINE W/DYE: CPT | Mod: 26,ME

## 2022-06-13 PROCEDURE — 62284 INJECTION FOR MYELOGRAM: CPT

## 2022-06-13 PROCEDURE — 72132 CT LUMBAR SPINE W/DYE: CPT | Mod: ME

## 2022-06-13 PROCEDURE — 72126 CT NECK SPINE W/DYE: CPT | Mod: ME

## 2022-06-13 PROCEDURE — 99153 MOD SED SAME PHYS/QHP EA: CPT

## 2022-06-13 PROCEDURE — 77003 FLUOROGUIDE FOR SPINE INJECT: CPT

## 2022-06-13 PROCEDURE — 72129 CT CHEST SPINE W/DYE: CPT | Mod: ME

## 2022-07-07 ENCOUNTER — APPOINTMENT (OUTPATIENT)
Dept: SPINE | Facility: CLINIC | Age: 62
End: 2022-07-07

## 2022-07-07 ENCOUNTER — NON-APPOINTMENT (OUTPATIENT)
Age: 62
End: 2022-07-07

## 2022-07-07 VITALS
WEIGHT: 175 LBS | TEMPERATURE: 97.1 F | RESPIRATION RATE: 16 BRPM | HEART RATE: 76 BPM | SYSTOLIC BLOOD PRESSURE: 128 MMHG | DIASTOLIC BLOOD PRESSURE: 79 MMHG | OXYGEN SATURATION: 99 % | BODY MASS INDEX: 33.04 KG/M2 | HEIGHT: 61 IN

## 2022-07-07 DIAGNOSIS — M54.2 CERVICALGIA: ICD-10-CM

## 2022-07-07 DIAGNOSIS — M54.50 LOW BACK PAIN, UNSPECIFIED: ICD-10-CM

## 2022-07-07 PROCEDURE — 99215 OFFICE O/P EST HI 40 MIN: CPT

## 2022-07-08 PROBLEM — M54.2 CERVICALGIA: Status: ACTIVE | Noted: 2022-07-08

## 2022-07-08 PROBLEM — M54.50 LUMBAGO: Status: ACTIVE | Noted: 2022-07-08

## 2022-07-08 NOTE — DISCUSSION/SUMMARY
[de-identified] : I do not see any target for surgical intervention then I recommend continuing with her current course of treatment I recommend she follow-up with pain management and we had a long discussion about the natural history of back and neck pain and answered all of her questions to the best my ability.\par \par Ambrosio Olivo M.D., M.Sc.\par \par Department of Neurosurgery\par [facility]\par [facility]\par [facility]\par [place], [place]\par [email or web address] [phone]

## 2022-07-08 NOTE — HISTORY OF PRESENT ILLNESS
[de-identified] : Patient returns today for evaluation of her CT myelogram and her EOS x-ray she continues to have severe debilitating back and buttock pain

## 2022-12-02 ENCOUNTER — OUTPATIENT (OUTPATIENT)
Dept: OUTPATIENT SERVICES | Facility: HOSPITAL | Age: 62
LOS: 1 days | End: 2022-12-02
Payer: MEDICARE

## 2022-12-02 ENCOUNTER — APPOINTMENT (OUTPATIENT)
Dept: ULTRASOUND IMAGING | Facility: HOSPITAL | Age: 62
End: 2022-12-02

## 2022-12-02 DIAGNOSIS — Z96.89 PRESENCE OF OTHER SPECIFIED FUNCTIONAL IMPLANTS: Chronic | ICD-10-CM

## 2022-12-02 DIAGNOSIS — Z98.89 OTHER SPECIFIED POSTPROCEDURAL STATES: Chronic | ICD-10-CM

## 2022-12-02 DIAGNOSIS — Z90.89 ACQUIRED ABSENCE OF OTHER ORGANS: Chronic | ICD-10-CM

## 2022-12-02 DIAGNOSIS — E04.9 NONTOXIC GOITER, UNSPECIFIED: Chronic | ICD-10-CM

## 2022-12-02 DIAGNOSIS — M20.40 OTHER HAMMER TOE(S) (ACQUIRED), UNSPECIFIED FOOT: Chronic | ICD-10-CM

## 2022-12-02 DIAGNOSIS — Z90.710 ACQUIRED ABSENCE OF BOTH CERVIX AND UTERUS: Chronic | ICD-10-CM

## 2022-12-02 DIAGNOSIS — Z96.642 PRESENCE OF LEFT ARTIFICIAL HIP JOINT: Chronic | ICD-10-CM

## 2022-12-02 PROCEDURE — 76536 US EXAM OF HEAD AND NECK: CPT

## 2022-12-02 PROCEDURE — 76536 US EXAM OF HEAD AND NECK: CPT | Mod: 26

## 2023-02-23 ENCOUNTER — APPOINTMENT (OUTPATIENT)
Dept: GASTROENTEROLOGY | Facility: CLINIC | Age: 63
End: 2023-02-23
Payer: MEDICARE

## 2023-02-23 DIAGNOSIS — K21.9 GASTRO-ESOPHAGEAL REFLUX DISEASE W/OUT ESOPHAGITIS: ICD-10-CM

## 2023-02-23 DIAGNOSIS — Z12.11 ENCOUNTER FOR SCREENING FOR MALIGNANT NEOPLASM OF COLON: ICD-10-CM

## 2023-02-23 PROCEDURE — 99202 OFFICE O/P NEW SF 15 MIN: CPT | Mod: 95

## 2023-02-23 RX ORDER — POLYETHYLENE GLYCOL 3350 AND ELECTROLYTES WITH LEMON FLAVOR 236; 22.74; 6.74; 5.86; 2.97 G/4L; G/4L; G/4L; G/4L; G/4L
236 POWDER, FOR SOLUTION ORAL
Qty: 1 | Refills: 0 | Status: ACTIVE | COMMUNITY
Start: 2023-02-23 | End: 1900-01-01

## 2023-02-23 RX ORDER — ROSUVASTATIN CALCIUM 20 MG/1
20 TABLET, FILM COATED ORAL
Refills: 0 | Status: ACTIVE | COMMUNITY
Start: 2023-02-23

## 2023-02-24 NOTE — ASSESSMENT
[FreeTextEntry1] : 64 y/o female with pmhx of hypothyroidism s/p two thyroidectomies for a multinodular goiter in 2006 and 2012 involving both lobes sequentially, GERD, dysphagia, asthma, thoracolumbar spinal cord stimulator presents to clinic for colon cancer screening and LUQ abd pain. \par \par Average risk index screening colonoscopy\par -Schedule colonoscopy at [facility]\par -Obtain pre-op labs (CBC + COMP)\par - Golytely bowel prep provided\par - r/b/a/i discussed and patient agreeable\par - Details of procedure, including risks of procedure which include but not limited to bleeding, perforation, infection, missed polyp discussed and patient gives verbal consent. Written consent to be obtained at time of procedure.\par - Pt was advised that an escort is needed to  from procedure\par - Will f/u post procedure\par \par Epigastric and left quadrant abdominal pain\par - Intermittent severe pain x 3 months with no associated triggers or relief\par - Order CT scan A/P for further evaluation\par - R/O diverticulitis vs colitis\par \par GERD\par -Schedule EGD at [facility]\par -Discussed life style modification such as food diary to track symptoms and identifying triggers, spacing out meals from bedtime, use of a wedge pillow at night\par -Pt encouraged to avoid triggers, spicy foods, caffeine, ETOH, smoking, NSAIDS, chocolate, fried fatty foods, dairy\par -Avoid laying down at least 3 hours post meal\par -Pt advised to keep head of bed elevated and sleep on the L side\par - Gaviscon prn for sx relief\par - Famotidine qhs \par \par \par \par

## 2023-02-24 NOTE — HISTORY OF PRESENT ILLNESS
[FreeTextEntry1] : 64 y/o female with pmhx of hypothyroidism s/p two thyroidectomies for a multinodular goiter in 2006 and 2012 involving both lobes sequentially, GERD, dysphagia, asthma, thoracolumbar spinal cord stimulator presents to clinic for colon cancer screening and LUQ abd pain. \par \par  Pt reports 3 months of severe epigastric abdominal pain and left upper abdominal pain, as if her intestines are twisting, intermittent nausea w/o vomiting and diarrhea alternating with constipation. Reports nocturnal symptoms associated w abdominal cramping and diarrhea. Denies dysphagia or odynophagia. Admits to "colic pain" which triggers mushy stools. She admits to 3-5 attacks per week. No recent abdominal imaging. She notes she has hemorrhoids and rarely notes blood on the toilet paper. Symptoms improve with pepto. No specific diet nor dietary triggers. Has tried peppermint supplements with limited relief. Denies weight loss, lack of appetite, fever or chills. \par \par Pt PCP is Dr. Simons at [facility]. Tavares requests recent records. \par \par 08/2015 c-scope: Entire colon was normal, repeat in 5-10 years. Unremarkable colon mucosa. No evidence of chronic or active colitis.

## 2023-03-14 ENCOUNTER — OUTPATIENT (OUTPATIENT)
Dept: OUTPATIENT SERVICES | Facility: HOSPITAL | Age: 63
LOS: 1 days | End: 2023-03-14
Payer: MEDICARE

## 2023-03-14 ENCOUNTER — APPOINTMENT (OUTPATIENT)
Dept: CT IMAGING | Facility: HOSPITAL | Age: 63
End: 2023-03-14
Payer: MEDICARE

## 2023-03-14 DIAGNOSIS — Z98.89 OTHER SPECIFIED POSTPROCEDURAL STATES: Chronic | ICD-10-CM

## 2023-03-14 DIAGNOSIS — E04.9 NONTOXIC GOITER, UNSPECIFIED: Chronic | ICD-10-CM

## 2023-03-14 DIAGNOSIS — Z90.89 ACQUIRED ABSENCE OF OTHER ORGANS: Chronic | ICD-10-CM

## 2023-03-14 DIAGNOSIS — Z90.710 ACQUIRED ABSENCE OF BOTH CERVIX AND UTERUS: Chronic | ICD-10-CM

## 2023-03-14 DIAGNOSIS — Z96.89 PRESENCE OF OTHER SPECIFIED FUNCTIONAL IMPLANTS: Chronic | ICD-10-CM

## 2023-03-14 DIAGNOSIS — M20.40 OTHER HAMMER TOE(S) (ACQUIRED), UNSPECIFIED FOOT: Chronic | ICD-10-CM

## 2023-03-14 DIAGNOSIS — Z96.642 PRESENCE OF LEFT ARTIFICIAL HIP JOINT: Chronic | ICD-10-CM

## 2023-03-14 LAB — POCT ISTAT CREATININE: 0.8 MG/DL — SIGNIFICANT CHANGE UP (ref 0.5–1.3)

## 2023-03-14 PROCEDURE — 74160 CT ABDOMEN W/CONTRAST: CPT

## 2023-03-14 PROCEDURE — 74160 CT ABDOMEN W/CONTRAST: CPT | Mod: 26,MH

## 2023-03-14 PROCEDURE — 82565 ASSAY OF CREATININE: CPT

## 2023-03-20 ENCOUNTER — NON-APPOINTMENT (OUTPATIENT)
Age: 63
End: 2023-03-20

## 2023-04-21 ENCOUNTER — APPOINTMENT (OUTPATIENT)
Dept: PAIN MANAGEMENT | Facility: CLINIC | Age: 63
End: 2023-04-21
Payer: MEDICARE

## 2023-04-21 VITALS
DIASTOLIC BLOOD PRESSURE: 78 MMHG | OXYGEN SATURATION: 98 % | WEIGHT: 176 LBS | HEIGHT: 61 IN | BODY MASS INDEX: 33.23 KG/M2 | SYSTOLIC BLOOD PRESSURE: 159 MMHG | HEART RATE: 77 BPM

## 2023-04-21 DIAGNOSIS — M25.562 PAIN IN LEFT KNEE: ICD-10-CM

## 2023-04-21 DIAGNOSIS — M54.50 LOW BACK PAIN, UNSPECIFIED: ICD-10-CM

## 2023-04-21 DIAGNOSIS — G89.29 LOW BACK PAIN, UNSPECIFIED: ICD-10-CM

## 2023-04-21 DIAGNOSIS — G89.29 PAIN IN LEFT KNEE: ICD-10-CM

## 2023-04-21 DIAGNOSIS — M70.61 TROCHANTERIC BURSITIS, RIGHT HIP: ICD-10-CM

## 2023-04-21 DIAGNOSIS — M70.62 TROCHANTERIC BURSITIS, RIGHT HIP: ICD-10-CM

## 2023-04-21 PROCEDURE — 77002 NEEDLE LOCALIZATION BY XRAY: CPT

## 2023-04-21 PROCEDURE — 99214 OFFICE O/P EST MOD 30 MIN: CPT | Mod: 25

## 2023-04-21 PROCEDURE — 20610 DRAIN/INJ JOINT/BURSA W/O US: CPT | Mod: 50

## 2023-04-21 NOTE — ASSESSMENT
[FreeTextEntry1] : Patient with signs and symptoms consistent with b/l greater trochanteric bursitis with lateral leg pain b/l and pain/tenderness to palpation of b/l GTB. Patient has not been to PT in > 6 months and a part of her lower extremity pain is 2/2 to deconditioning and muscle weakness. We spoke to the patient about pursuing b/l GTB steroid injections in the office today. All risks and benefits of the procedure were thoroughly explained. Patient wishes to proceed with injection.

## 2023-04-21 NOTE — REVIEW OF SYSTEMS
[Feeling Tired] : fatigue [Back Pain] : back pain [Neck Pain] : neck pain [Muscle Pain] : muscle pain [Radiating Pain] : radiating pain [Joint Pain] : joint pain [Joint Stiffness] : joint stiffness [Decreased ROM] : decreased range of motion [Weakness] : weakness [Numbness] : numbness [Chills] : no chills [Fever] : no fever [Recent Weight Gain (___ Lbs)] : no recent ~M [unfilled] weight gain [Headache] : no headache [Dizziness] : no dizziness [Convulsions] : no convulsions

## 2023-04-21 NOTE — PHYSICAL EXAM
[Normal] : Regular, no tachycardia [Normal muscle bulk without asymmetry] : normal muscle bulk without asymmetry [GreaterTrochanteric bursa tenderness] : greater trochanteric bursa tenderness [Antalgic] : antalgic [Cane] : ambulates with cane [SLR] : positive straight leg raise [] : Motor: [___/5] : left ([unfilled]/5) [Motor Strength Lower Extremities] : right (5/5) [Patellar] : patellar 2+ and symmetric bilaterally [Achilles] : Achilles 2+ and symmetric bilaterally [Sacroiliac tenderness] : no sacroiliac tenderness [de-identified] : d

## 2023-04-21 NOTE — HISTORY OF PRESENT ILLNESS
[Back Pain] : back pain [_______] : [unfilled] [Constant] : constant [10] : a current pain level of 10/10 [Sharp] : sharp [Dull] : dull [Aching] : aching [Burning] : burning [Sitting] : sitting [Standing] : standing [Walking] : walking [Medications] : medications [Ice] : ice [FreeTextEntry1] : Patient is a 63 y/o female with hx of previous L4/5, ALIF and post laminectomy syndrome with SCS stimulator (b/l T12, S1) presenting with worsening chronic lower back and b/l leg pain. Patient states she feels like her pain and walking having become progressively worse over past year. Patient was evaluated by rheumatologists, neurologists and neurosurgeon (spine). Patient was not deemed surgical candidate. No rheumatologic disease was found and neurologist performed EMG which only showed mild chronic Left L5 radiculopathy consistent with prior history. Patient states she cannot walk more than a few blocks without pain in her legs and feet. Patient states she feels unsteady and needs to use a cane. She states unbalanced is mostly 2/2 to pain. Patient denies any fever, bowel/bladder incontinence, numbness/tingling. Patient has not been to PT/HEP in the recent months.

## 2023-04-26 ENCOUNTER — NON-APPOINTMENT (OUTPATIENT)
Age: 63
End: 2023-04-26

## 2023-04-27 ENCOUNTER — APPOINTMENT (OUTPATIENT)
Age: 63
End: 2023-04-27
Payer: MEDICARE

## 2023-04-27 ENCOUNTER — RESULT REVIEW (OUTPATIENT)
Age: 63
End: 2023-04-27

## 2023-04-27 PROCEDURE — 43239 EGD BIOPSY SINGLE/MULTIPLE: CPT

## 2023-04-27 PROCEDURE — 45380 COLONOSCOPY AND BIOPSY: CPT

## 2023-05-11 ENCOUNTER — APPOINTMENT (OUTPATIENT)
Dept: PAIN MANAGEMENT | Facility: CLINIC | Age: 63
End: 2023-05-11
Payer: MEDICARE

## 2023-05-11 VITALS
DIASTOLIC BLOOD PRESSURE: 76 MMHG | WEIGHT: 176 LBS | HEIGHT: 61 IN | OXYGEN SATURATION: 98 % | SYSTOLIC BLOOD PRESSURE: 119 MMHG | HEART RATE: 63 BPM | BODY MASS INDEX: 33.23 KG/M2

## 2023-05-11 DIAGNOSIS — M48.02 SPINAL STENOSIS, CERVICAL REGION: ICD-10-CM

## 2023-05-11 DIAGNOSIS — M54.16 RADICULOPATHY, LUMBAR REGION: ICD-10-CM

## 2023-05-11 PROCEDURE — 99213 OFFICE O/P EST LOW 20 MIN: CPT

## 2023-05-11 NOTE — ASSESSMENT
[FreeTextEntry1] : Pt with chronic low neck pain. She has T12, S1 DRG in place which provides some relief of back and leg pain however continues to endorse persistent pain, which has gradually gotten worse. SHe has tried LESI with outside provider 3 months ago without much relief. We will obtain new CT L spine for further evaluation as patient reports symptoms have significantly worsened since last imaging 1 year ago. May consider repeat LESI to see if that alleviates some of her pain symptoms. \par SHe also continues to endorse neck pain into BUE. She was previously referred for EMG UE which she is scheduled to complete in July, will follow up after study complete. May consider RAFITA for radicular symptoms pending EMG. \par Would also recommend patient see pain psychologist given persistent pain symptoms despite multiple interventions and patient reports chronic pain is making her feel more depressed lately

## 2023-05-11 NOTE — REVIEW OF SYSTEMS
[Back Pain] : back pain [Muscle Pain] : muscle pain [Radiating Pain] : radiating pain [Joint Pain] : joint pain [Joint Stiffness] : joint stiffness [Decreased ROM] : decreased range of motion [Weakness] : weakness [Negative] : Heme/Lymph [Neck Pain] : no neck pain

## 2023-05-11 NOTE — HISTORY OF PRESENT ILLNESS
[_______] : [unfilled] [Constant] : constant [10] : a current pain level of 10/10 [Sharp] : sharp [Sitting] : sitting [Standing] : standing [Walking] : walking [Bending] : bending [Medications] : medications [Rest] : rest [Back Pain] : back pain [Neck Pain] : neck pain [Throbbing] : throbbing [Burning] : burning [FreeTextEntry1] : 61 y/o female with hx of previous L4/5, ALIF and post laminectomy syndrome with SCS stimulator (b/l T12, S1) presenting with worsening chronic lower back and b/l leg pain. She was last seen and had b/l GTB injections on office. Today she reports lateral hip pain has improved since injection. Today she reports left groin pain, worse with sitting/standing, going up/down stairs and walking. Has been taking Tylenol PRN. Was advised to avoid NSAIDs due to finding of stomach ulcer. She has history of left hip replacement, last saw ortho who told her hardware intact. \par She also continues to experience neck and low back pain which radiates into her BUE and BLE. She had RAFITA as well as LESI with outside provider about 3 months ago which she reports only provided releif for 1 day. Pain has become so severe that she feels she is "bed bound" and unable to perform ADLs. Also endorses feeling depressed due to chronic pain. She saw NSGY Dr Olivo who did not recommend surgery based on most recent xray and ct myelogram

## 2023-05-11 NOTE — PHYSICAL EXAM
[Spinous Process Tenderness] : spinous process tenderness [Facet Tenderness] : facet tenderness [Paraspinal Tenderness] : paraspinal tenderness [Antalgic] : antalgic [Cane] : ambulates with cane [SLR] : positive straight leg raise [NED Test] : positive NED Test (Roger's Test) [Gillespie's Test] : positive Gillespie's Test [UE/LE] : Sensory: Intact in bilateral upper & lower extremities [Normal] : Skin color, texture, turgor normal, no rashes or lesions in the four extremities and back [FADIR Test] : negative FADIR test [de-identified] : depressed affect  [de-identified] : limited AROM all planes due to pain

## 2023-05-16 NOTE — ED ADULT NURSE NOTE - CAS TRG GENERAL NORM CIRC DET
[facility]  Obstetric History and Physical    Chief Complaint   Patient presents with   • Elevated Blood Pressure       Subjective     Patient is a 34 y.o. female  currently at 37w2d, who presents with complaints of elevated blood pressure.  She denies current headache, scotomata or epigastric pain.  She has had some low back pain and did have a headache a few days ago but this resolved with Tylenol.  She reports active fetal movement.  She denies abdominal pain, vaginal bleeding or leakage of fluid.    Her prenatal care is notable for type 2 diabetes mellitus complicating pregnancy.  This is diet-controlled. Her previous obstetric/gynecological history is notable for 2 prior  sections.  She did have gestational diabetes with her last pregnancy.  She was diagnosed with hyperglycemia at 8 weeks gestation this pregnancy which most likely represents type 2 diabetes mellitus pre-existing pregnancy.    The following portions of the patients history were reviewed and updated as appropriate: current medications, allergies, past medical history, past surgical history, past family history, past social history and problem list .        Prenatal Information:   Maternal Prenatal Labs  Blood Type No results found for: ABO   Rh Status No results found for: RH   Antibody Screen No results found for: ABSCRN   Gonnorhea No results found for: GCCX   Chlamydia No results found for: CLAMYDCU   RPR No results found for: RPR   Syphilis Antibody No results found for: SYPHILIS   Rubella No results found for: RUBELLAIGGIN   Hepatitis B Surface Antigen No results found for: HEPBSAG   HIV-1 Antibody No results found for: LABHIV1   Hepatitis C Antibody No results found for: HEPCAB   Rapid Urin Drug Screen No results found for: AMPMETHU, BARBITSCNUR, LABBENZSCN, LABMETHSCN, LABOPIASCN, THCURSCR, COCAINEUR, AMPHETSCREEN, PROPOXSCN, BUPRENORSCNU, METAMPSCNUR, OXYCODONESCN, TRICYCLICSCN   Group B Strep Culture No results found  for: GBSANTIGEN           External Prenatal Results     Pregnancy Outside Results - Transcribed From Office Records - See Scanned Records For Details     Test Value Date Time    ABO  O  23 1225    Rh  Positive  23 1225    Antibody Screen  Negative  23 1225    Varicella IgG       Rubella       Hgb  13.3 g/dL 23 1619       12.7 g/dL 23 1225      ^ 13.0 g/dL 10/24/22 1150      ^ 12.8 g/dL 10/04/22 1554    Hct  40.1 % 23 1619       36.9 % 23 1225      ^ 41.9 % 10/24/22 1150      ^ 40.1 % 10/04/22 1554    Glucose Fasting GTT       Glucose Tolerance Test 1 hour       Glucose Tolerance Test 3 hour       Gonorrhea (discrete)       Chlamydia (discrete)       RPR       VDRL       Syphilis Antibody       HBsAg ^ Negative  10/24/22 1150    Herpes Simplex Virus PCR       Herpes Simplex VIrus Culture       HIV ^ Nonreactive  10/24/22 1150    Hep C RNA Quant PCR       Hep C Antibody       AFP       Group B Strep       GBS Susceptibility to Clindamycin       GBS Susceptibility to Erythromycin       Fetal Fibronectin       Genetic Testing, Maternal Blood             Drug Screening     Test Value Date Time    Urine Drug Screen ^ Positive  10/24/22 1150    Amphetamine Screen       Barbiturate Screen       Benzodiazepine Screen       Methadone Screen       Phencyclidine Screen       Opiates Screen       THC Screen       Cocaine Screen       Propoxyphene Screen       Buprenorphine Screen       Methamphetamine Screen       Oxycodone Screen       Tricyclic Antidepressants Screen             Legend    ^: Historical                          Past OB History:       OB History    Para Term  AB Living   3 2 2 0 0 2   SAB IAB Ectopic Molar Multiple Live Births   0 0 0 0 0 2      # Outcome Date GA Lbr Rosalio/2nd Weight Sex Delivery Anes PTL Lv   3 Current            2 Term 21 37w0d  3629 g (8 lb) M CS-LTranv EPI N GUMARO      Birth Comments: scheduled RCS for GDM      Complications:  Gestational diabetes mellitus (GDM)      Name: Jd Good Term 12 39w0d  3175 g (7 lb) M CS-LTranv EPI N GUMARO      Complications: Dysfunctional Labor, Failure to Progress in First Stage      Name: Pradip      Obstetric Comments   FOB #1 : Pregnancy #1   FOB #2 : Pregnancy #2; #3 (NIPS = negative, male)         Past Medical History:  Past Medical History:   Diagnosis Date   • Anxiety    • Gestational diabetes     dx @ 8 weeks gestation; hx of GDM in one previous pregnancy      Past Surgical History Past Surgical History:   Procedure Laterality Date   •  SECTION      X2      Family History: History reviewed. No pertinent family history.   Social History:  reports that she quit smoking about 16 months ago. Her smoking use included cigarettes. She smoked an average of .25 packs per day. She has quit using smokeless tobacco.   reports that she does not currently use alcohol.   reports no history of drug use.   Allergies: Amoxicillin and Penicillins  Current Medications:          No current facility-administered medications on file prior to encounter.     Current Outpatient Medications on File Prior to Encounter   Medication Sig Dispense Refill   • glucose blood (OneTouch Ultra) test strip Use as instructed 100 each 12       General ROS: Pertinent items are noted in HPI, all other systems reviewed and negative    Objective       Vital Signs Range for the last 24 hours  Temperature: Temp:  [98.2 °F (36.8 °C)] 98.2 °F (36.8 °C)   Temp Source: Temp src: Oral   BP: BP: (111-127)/(70-82) 111/70   Pulse: Heart Rate:  [93-95] 93   Respirations: Resp:  [16] 16   SPO2: SpO2:  [99 %] 99 %   O2 Amount (l/min):     O2 Devices     Weight: Weight:  [103 kg (227 lb)] 103 kg (227 lb)     Physical Examination: General appearance - alert, well appearing, and in no distress, oriented to person, place, and time and overweight  Mental status - alert, oriented to person, place, and time, normal mood, behavior, speech, dress,  motor activity, and thought processes  Eyes - pupils equal and reactive, extraocular eye movements intact, sclera anicteric  Neck - supple, no significant adenopathy, thyroid exam: thyroid is normal in size without nodules or tenderness  Chest - clear to auscultation, no wheezes, rales or rhonchi, symmetric air entry  Heart - normal rate, regular rhythm, normal S1, S2, no murmurs, rubs, clicks or gallops  Abdomen-soft, nontender, nondistended, no masses or organomegaly   Abdomen, Non-Tender  Neurological - alert, oriented, normal speech, no focal findings or movement disorder noted, DTR's normal and symmetric  Extremities - no pedal edema noted    Fetal Heart Rate Assessment  Indication: Transient hypertension   Start Time: 1514                end Time: 1716   NST Results: Reactive NST.  Fetal heart rate baseline 135-145 bpm.  Average variability with 15 x 15 accelerations noted.  No decelerations.  Irregular mild contractions noted every 5-10 minutes.        Laboratory Results:   Lab Results   Component Value Date    ALKPHOS 103 2023    ALT 9 2023    AST 19 2023    CREATININE 0.43 (L) 2023    BILITOT 0.2 2023     2023    URICACID 3.4 2023       WBC   Date Value Ref Range Status   2023 6.70 3.40 - 10.80 10*3/mm3 Final     RBC   Date Value Ref Range Status   2023 4.27 3.77 - 5.28 10*6/mm3 Final     Hemoglobin   Date Value Ref Range Status   2023 13.3 12.0 - 15.9 g/dL Final     Hematocrit   Date Value Ref Range Status   2023 40.1 34.0 - 46.6 % Final     MCV   Date Value Ref Range Status   2023 93.9 79.0 - 97.0 fL Final     MCH   Date Value Ref Range Status   2023 31.1 26.6 - 33.0 pg Final     MCHC   Date Value Ref Range Status   2023 33.2 31.5 - 35.7 g/dL Final     RDW   Date Value Ref Range Status   2023 12.0 (L) 12.3 - 15.4 % Final     RDW-SD   Date Value Ref Range Status   2023 41.2 37.0 - 54.0 fl Final  "    MPV   Date Value Ref Range Status   2023 10.1 6.0 - 12.0 fL Final     Platelets   Date Value Ref Range Status   2023 184 140 - 450 10*3/mm3 Final             Brief Urine Lab Results  (Last result in the past 365 days)      Color   Clarity   Blood   Leuk Est   Nitrite   Protein   CREAT   Urine HCG        23 1609           Negative                   Radiology Review: No new studies  Other Studies: CBC and PEP show no evidence of HELLP.    Assessment & Plan       * No active hospital problems. *        Assessment:  1. Transient hypertension.  No evidence of gestational hypertension.  2. Type 2 diabetes mellitus complicating pregnancy (diet-controlled).  3. Maternal obesity  4. History of  section x2.    Plan:  1. Discharge home.  2. Reviewed signs/symptoms of preeclampsia/HELLP.  3. Keep regularly scheduled OB office visit.     Total time spent today with Lei  was 30-39 minutes (level 4).  Of this time, > 50% was spent face-to-face time coordinating care, answering her questions and counseling regarding pathophysiology of her presenting problem along with plans for any diagnostic work-up and treatment.        Guillaume Sanchez \"Rylie\" WESLEY Roque MD  2023  17:18 EDT    " Strong peripheral pulses

## 2023-05-20 ENCOUNTER — APPOINTMENT (OUTPATIENT)
Dept: CT IMAGING | Facility: HOSPITAL | Age: 63
End: 2023-05-20

## 2023-05-20 ENCOUNTER — OUTPATIENT (OUTPATIENT)
Dept: OUTPATIENT SERVICES | Facility: HOSPITAL | Age: 63
LOS: 1 days | End: 2023-05-20
Payer: MEDICARE

## 2023-05-20 DIAGNOSIS — Z98.89 OTHER SPECIFIED POSTPROCEDURAL STATES: Chronic | ICD-10-CM

## 2023-05-20 DIAGNOSIS — Z96.642 PRESENCE OF LEFT ARTIFICIAL HIP JOINT: Chronic | ICD-10-CM

## 2023-05-20 DIAGNOSIS — Z96.89 PRESENCE OF OTHER SPECIFIED FUNCTIONAL IMPLANTS: Chronic | ICD-10-CM

## 2023-05-20 DIAGNOSIS — Z90.710 ACQUIRED ABSENCE OF BOTH CERVIX AND UTERUS: Chronic | ICD-10-CM

## 2023-05-20 DIAGNOSIS — E04.9 NONTOXIC GOITER, UNSPECIFIED: Chronic | ICD-10-CM

## 2023-05-20 DIAGNOSIS — Z90.89 ACQUIRED ABSENCE OF OTHER ORGANS: Chronic | ICD-10-CM

## 2023-05-20 DIAGNOSIS — M20.40 OTHER HAMMER TOE(S) (ACQUIRED), UNSPECIFIED FOOT: Chronic | ICD-10-CM

## 2023-05-20 PROCEDURE — 72131 CT LUMBAR SPINE W/O DYE: CPT

## 2023-05-20 PROCEDURE — 72131 CT LUMBAR SPINE W/O DYE: CPT | Mod: 26,MH

## 2023-05-20 NOTE — HISTORY OF PRESENT ILLNESS
[Worsening] : worsening [de-identified] : 59 y/o F presents today for follow up of bilateral knee pain, pain in left > right. She reports severe pain and stiffness bilaterally as well as feeling unstable on al surfaces.\par Patient is able to walk less than 5 blocks with a slight limp. She uses a cane for ambulation. Patient has had cortisone and gel injections, which did not provide lasting relief.  She has tried PT without relief.\par Of note, patient has been seeing Dr. Jay for pain management. She has a spinal cord stimulator implanted.  No

## 2023-05-25 ENCOUNTER — APPOINTMENT (OUTPATIENT)
Dept: PAIN MANAGEMENT | Facility: CLINIC | Age: 63
End: 2023-05-25

## 2023-05-26 ENCOUNTER — NON-APPOINTMENT (OUTPATIENT)
Age: 63
End: 2023-05-26

## 2023-05-31 ENCOUNTER — TRANSCRIPTION ENCOUNTER (OUTPATIENT)
Age: 63
End: 2023-05-31

## 2023-05-31 ENCOUNTER — NON-APPOINTMENT (OUTPATIENT)
Age: 63
End: 2023-05-31

## 2023-06-23 ENCOUNTER — APPOINTMENT (OUTPATIENT)
Dept: PAIN MANAGEMENT | Facility: CLINIC | Age: 63
End: 2023-06-23
Payer: MEDICARE

## 2023-06-23 VITALS
SYSTOLIC BLOOD PRESSURE: 149 MMHG | BODY MASS INDEX: 31.53 KG/M2 | DIASTOLIC BLOOD PRESSURE: 73 MMHG | HEART RATE: 64 BPM | HEIGHT: 61 IN | WEIGHT: 167 LBS | RESPIRATION RATE: 18 BRPM | OXYGEN SATURATION: 94 %

## 2023-06-23 PROCEDURE — 62323 NJX INTERLAMINAR LMBR/SAC: CPT

## 2023-07-21 ENCOUNTER — APPOINTMENT (OUTPATIENT)
Dept: PAIN MANAGEMENT | Facility: CLINIC | Age: 63
End: 2023-07-21

## 2023-07-25 ENCOUNTER — APPOINTMENT (OUTPATIENT)
Age: 63
End: 2023-07-25
Payer: MEDICARE

## 2023-07-25 VITALS
SYSTOLIC BLOOD PRESSURE: 136 MMHG | RESPIRATION RATE: 15 BRPM | OXYGEN SATURATION: 98 % | BODY MASS INDEX: 30.96 KG/M2 | WEIGHT: 164 LBS | HEIGHT: 61 IN | DIASTOLIC BLOOD PRESSURE: 79 MMHG | HEART RATE: 64 BPM | TEMPERATURE: 97.7 F

## 2023-07-25 DIAGNOSIS — R10.9 UNSPECIFIED ABDOMINAL PAIN: ICD-10-CM

## 2023-07-25 DIAGNOSIS — K27.9 PEPTIC ULCER, SITE UNSPECIFIED, UNSPECIFIED AS ACUTE OR CHRONIC, W/OUT HEMORRHAGE OR PERFORATION: ICD-10-CM

## 2023-07-25 DIAGNOSIS — K59.09 OTHER CONSTIPATION: ICD-10-CM

## 2023-07-25 PROCEDURE — 99214 OFFICE O/P EST MOD 30 MIN: CPT

## 2023-07-25 NOTE — PHYSICAL EXAM
[Alert] : alert [Normal Voice/Communication] : normal voice/communication [No Acute Distress] : no acute distress [Well Developed] : well developed [No Respiratory Distress] : no respiratory distress [No Acc Muscle Use] : no accessory muscle use [Respiration, Rhythm And Depth] : normal respiratory rhythm and effort [No Masses] : no abdominal mass palpated [Abdomen Soft] : soft [] : no hepatosplenomegaly [LUQ] : LUQ [Epigastric] : epigastric [Normal] : oriented to person, place, and time

## 2023-07-26 NOTE — ASSESSMENT
[FreeTextEntry1] : 64 y/o female with pmhx of hypothyroidism s/p two thyroidectomies for a multinodular goiter in 2006 and 2012 involving both lobes sequentially, GERD, dysphagia, asthma, thoracolumbar spinal cord stimulator s/p recent EGD/Colonoscopy noting PUD and 2 sub cm TA here with persistent epigastric/L sided abd pain\par \par Adherent on PPI but sx largely unchanged.  No clear etiology for PUD and bx unrevealing with neg H. pylori. \par \par CT A/P in March 2023 without findings to explain pain \par \par -EGD/Colonoscopy report and path reviewed\par -Increase omeprazole to 40 mg BID\par -Plan for repeat EGD given persistent sx and gastric ulcers\par -Avoid NSAIDs\par -Trial of Linzess 72 mcg daily x 2 weeks for chronic constipation. Samples provided today. Will increase dose if effective\par -F/u colonoscopy in 7 years given sub cm TA x 2\par \par Rajendra Chopra, DO\par Gastroenterology Fellow

## 2023-07-26 NOTE — HISTORY OF PRESENT ILLNESS
[FreeTextEntry1] : 64 y/o female with pmhx of hypothyroidism s/p two thyroidectomies for a multinodular goiter in 2006 and 2012 involving both lobes sequentially, GERD, dysphagia, asthma, thoracolumbar spinal cord stimulator here to f/u ongoing epigastric/abd pain.\par \par Underwent EGD/Colonoscopy in April 2023 for 3 months of severe epigastric abdominal pain and left upper abdominal pain.  At that time, she described sensation as if her intestines are twisting, intermittent nausea w/o vomiting and diarrhea alternating with constipation. Also with occasional nocturnal symptoms associated w abdominal cramping and diarrhea.  No dysphagia or odynophagia. \par \par Colonoscopy was for colon cancer screening.\par \par Findings as noted:\par \par EGD:\par -Medium hiatral hernia\par -Grade A esophagitis\par -Hill Grade III GE flap vave\par -Gastric ulcer with pigmented materia along greater curvature and gastric body, largest 10 mm\par -Gastritis in antrum.  Biopsies unremarkable. H. pylori neg\par -Duodenum normal\par \par Colonoscopy:\par -3 mm descending colon TA\par -2 mm descending colon TA\par \par Since EGD/Colonoscopy, sx basically unchanged. Has been adherent with omeprazole 40 mg daily but feels sx remain unchanged.  No NSAIDs.  No anticoag/DAPT. No prior hx of PUD or H, plyori.  She describes having 1 hard but brown BM/day followed by liquid stool. No melena/hematochezia. No significant change to cramping abd pain after BM.  Tried miralax once but didn’t feel it provided quick enough relief.  No change in diet.\par \par Food doesn’t make abd pain worse/better.  No dysphagia/odynophagia. No significant weight loss\par \par Pt PCP is Dr. Simons at [facility]. [place] requests recent records. \par \par 08/2015 c-scope: Entire colon was normal, repeat in 5-10 years. Unremarkable colon mucosa. No evidence of chronic or active colitis.

## 2023-08-08 RX ORDER — LINACLOTIDE 72 UG/1
72 CAPSULE, GELATIN COATED ORAL DAILY
Qty: 30 | Refills: 2 | Status: ACTIVE | COMMUNITY
Start: 2023-08-08 | End: 1900-01-01

## 2023-08-24 ENCOUNTER — APPOINTMENT (OUTPATIENT)
Age: 63
End: 2023-08-24
Payer: MEDICARE

## 2023-08-24 ENCOUNTER — RESULT REVIEW (OUTPATIENT)
Age: 63
End: 2023-08-24

## 2023-08-24 PROCEDURE — 43239 EGD BIOPSY SINGLE/MULTIPLE: CPT

## 2023-10-16 ENCOUNTER — APPOINTMENT (OUTPATIENT)
Age: 63
End: 2023-10-16

## 2023-12-27 NOTE — PROCEDURE
[FreeTextEntry1] : Interlaminar lumbar epidural steroid injection L3-4   The potential benefits as well as rare but possible risks were reviewed with the patient.  These risks including infection including epidural abscess, meningitis, osteomyelitis, and discitis, bleeding including epidural hematoma, nerve injury, paralysis, failure to relieve pain or worse pain, headache, pneumothorax, elevated blood sugars, allergic reactions, adverse reactions to medications, vasovagal reactions, falls, etc. Following that discussion, all questions were again answered to the patients satisfaction, the patient stated their verbal understanding and written consent was obtained.   After obtaining consent, pre-procedure blood pressure and heart rate were stable and recorded in the nursing record. Standard monitors were applied. The patient was placed in the prone position. The lumbosacral area was widely prepped with chloroprep and draped in sterile fashion. Fluoroscopic guidance was used to identify the desired interlaminar space and for needle placement. Subcutaneous 0.5% lidocaine was used to anesthetize the skin overlying the target. A 20-gauge Tuohy needle was advanced to the epidural space using loss of resistance to air technique and AP / contralateral oblique views under fluoroscopy. There was no evidence of heme or CSF and no paresthesias were elicited with needle placement.   Confirmation of epidural needle placement was performed with 0.5 cc of omnipaque Next 3 ml preservative free normal saline and 10 mg dexamethasone was administered epidurally with no pain elicited on injection. The needle was removed, skin cleansed with alcohol and a sterile bandage was applied. The patient tolerated the procedure well and no complications were encountered. Following the procedure, the patient's vital signs were stable. The patient was discharged home in good condition with post-procedural instructions.   Time Out: Immediately prior to the procedure, the following was verbally confirmed that there is a consent form and that the correct patient, planned procedure, site and side are consistent with documentation and that necessary equipment and/or blood products are available prior to the start of the case.   Complications: none EBL: <5 cc

## 2024-02-20 NOTE — CONSULT NOTE ADULT - ASSESSMENT
56 y/o female s/p spinal stim placement 10 days ago with chest discomfort and reports of trouble breathing, PE study neg, CXR neg, neurologically intact  -spinal stim placement intact  -no neurosurg intervention  -would refer to PMD vs medicine for dyspepsia vs further cardiac work up considering externsive pmhx  -re-consnult prn  -case d/w Dr. Kay no gross abnormalities

## 2024-03-06 ENCOUNTER — OUTPATIENT (OUTPATIENT)
Dept: OUTPATIENT SERVICES | Facility: HOSPITAL | Age: 64
LOS: 1 days | End: 2024-03-06
Payer: MEDICARE

## 2024-03-06 ENCOUNTER — APPOINTMENT (OUTPATIENT)
Dept: CT IMAGING | Facility: HOSPITAL | Age: 64
End: 2024-03-06

## 2024-03-06 DIAGNOSIS — Z98.89 OTHER SPECIFIED POSTPROCEDURAL STATES: Chronic | ICD-10-CM

## 2024-03-06 DIAGNOSIS — Z90.710 ACQUIRED ABSENCE OF BOTH CERVIX AND UTERUS: Chronic | ICD-10-CM

## 2024-03-06 DIAGNOSIS — Z96.89 PRESENCE OF OTHER SPECIFIED FUNCTIONAL IMPLANTS: Chronic | ICD-10-CM

## 2024-03-06 DIAGNOSIS — Z96.642 PRESENCE OF LEFT ARTIFICIAL HIP JOINT: Chronic | ICD-10-CM

## 2024-03-06 DIAGNOSIS — E04.9 NONTOXIC GOITER, UNSPECIFIED: Chronic | ICD-10-CM

## 2024-03-06 DIAGNOSIS — M20.40 OTHER HAMMER TOE(S) (ACQUIRED), UNSPECIFIED FOOT: Chronic | ICD-10-CM

## 2024-03-06 DIAGNOSIS — Z90.89 ACQUIRED ABSENCE OF OTHER ORGANS: Chronic | ICD-10-CM

## 2024-03-06 PROCEDURE — 82565 ASSAY OF CREATININE: CPT

## 2024-03-06 PROCEDURE — 70470 CT HEAD/BRAIN W/O & W/DYE: CPT | Mod: MB

## 2024-03-06 PROCEDURE — 70470 CT HEAD/BRAIN W/O & W/DYE: CPT | Mod: 26,MB

## 2024-04-11 RX ORDER — OMEPRAZOLE 40 MG/1
40 CAPSULE, DELAYED RELEASE ORAL TWICE DAILY
Qty: 180 | Refills: 1 | Status: ACTIVE | COMMUNITY
Start: 2023-04-27 | End: 1900-01-01

## 2024-12-10 NOTE — ASU PREOP CHECKLIST - PATIENT SENT AT
Per [employer] online co-pays for voriconazole TAB 200MG  $225.63, Beginning January 1, 2025, Cresemba  MG $1929.00, Mavyret -40MG   $4046.00. Part D changes for 2025; people with Part D plans through traditional Medicare and Medicare Advantage plans with prescription drug coverage won’t pay more than $2,000 over the calendar year in out-of-pocket costs for their prescription medications. All prescription medications, including specialty medications, covered by Part D plans are included under this cap, including deductibles, copayments, and coinsurance for covered drugs.  
01-May-2017 08:28